# Patient Record
Sex: MALE | Race: OTHER | HISPANIC OR LATINO
[De-identification: names, ages, dates, MRNs, and addresses within clinical notes are randomized per-mention and may not be internally consistent; named-entity substitution may affect disease eponyms.]

---

## 2017-07-31 ENCOUNTER — APPOINTMENT (OUTPATIENT)
Dept: OPHTHALMOLOGY | Facility: CLINIC | Age: 62
End: 2017-07-31

## 2017-09-25 ENCOUNTER — APPOINTMENT (OUTPATIENT)
Dept: INTERNAL MEDICINE | Facility: CLINIC | Age: 62
End: 2017-09-25
Payer: COMMERCIAL

## 2017-09-25 PROCEDURE — 99214 OFFICE O/P EST MOD 30 MIN: CPT

## 2017-10-09 ENCOUNTER — APPOINTMENT (OUTPATIENT)
Dept: PULMONOLOGY | Facility: CLINIC | Age: 62
End: 2017-10-09
Payer: COMMERCIAL

## 2017-10-09 VITALS
RESPIRATION RATE: 16 BRPM | DIASTOLIC BLOOD PRESSURE: 92 MMHG | BODY MASS INDEX: 36.58 KG/M2 | SYSTOLIC BLOOD PRESSURE: 150 MMHG | WEIGHT: 247 LBS | OXYGEN SATURATION: 98 % | HEART RATE: 84 BPM | HEIGHT: 69 IN

## 2017-10-09 PROCEDURE — 99214 OFFICE O/P EST MOD 30 MIN: CPT

## 2017-11-08 ENCOUNTER — APPOINTMENT (OUTPATIENT)
Dept: INTERNAL MEDICINE | Facility: CLINIC | Age: 62
End: 2017-11-08
Payer: COMMERCIAL

## 2017-11-08 PROCEDURE — 99214 OFFICE O/P EST MOD 30 MIN: CPT

## 2018-04-02 ENCOUNTER — APPOINTMENT (OUTPATIENT)
Dept: PULMONOLOGY | Facility: CLINIC | Age: 63
End: 2018-04-02
Payer: COMMERCIAL

## 2018-04-02 VITALS
SYSTOLIC BLOOD PRESSURE: 148 MMHG | HEART RATE: 73 BPM | RESPIRATION RATE: 16 BRPM | DIASTOLIC BLOOD PRESSURE: 90 MMHG | OXYGEN SATURATION: 97 % | HEIGHT: 69 IN | WEIGHT: 245 LBS | BODY MASS INDEX: 36.29 KG/M2

## 2018-04-02 PROCEDURE — 99214 OFFICE O/P EST MOD 30 MIN: CPT

## 2018-04-11 RX ORDER — LISINOPRIL 10 MG/1
10 TABLET ORAL DAILY
Qty: 90 | Refills: 1 | Status: DISCONTINUED | COMMUNITY
Start: 2017-11-08 | End: 2018-04-11

## 2018-05-08 ENCOUNTER — RX RENEWAL (OUTPATIENT)
Age: 63
End: 2018-05-08

## 2018-08-09 RX ORDER — VALSARTAN 80 MG/1
80 TABLET, COATED ORAL
Qty: 30 | Refills: 3 | Status: DISCONTINUED | COMMUNITY
Start: 2018-04-11 | End: 2018-08-09

## 2018-10-03 ENCOUNTER — APPOINTMENT (OUTPATIENT)
Dept: PULMONOLOGY | Facility: CLINIC | Age: 63
End: 2018-10-03
Payer: COMMERCIAL

## 2018-10-03 VITALS
HEIGHT: 69 IN | WEIGHT: 245 LBS | HEART RATE: 72 BPM | OXYGEN SATURATION: 95 % | DIASTOLIC BLOOD PRESSURE: 81 MMHG | BODY MASS INDEX: 36.29 KG/M2 | SYSTOLIC BLOOD PRESSURE: 125 MMHG

## 2018-10-03 DIAGNOSIS — J06.9 ACUTE UPPER RESPIRATORY INFECTION, UNSPECIFIED: ICD-10-CM

## 2018-10-03 PROCEDURE — 99214 OFFICE O/P EST MOD 30 MIN: CPT

## 2018-10-08 ENCOUNTER — RX RENEWAL (OUTPATIENT)
Age: 63
End: 2018-10-08

## 2018-10-11 ENCOUNTER — RX RENEWAL (OUTPATIENT)
Age: 63
End: 2018-10-11

## 2018-10-12 ENCOUNTER — APPOINTMENT (OUTPATIENT)
Dept: INTERNAL MEDICINE | Facility: CLINIC | Age: 63
End: 2018-10-12
Payer: COMMERCIAL

## 2018-10-12 VITALS
BODY MASS INDEX: 36.88 KG/M2 | SYSTOLIC BLOOD PRESSURE: 137 MMHG | RESPIRATION RATE: 14 BRPM | HEART RATE: 71 BPM | DIASTOLIC BLOOD PRESSURE: 89 MMHG | WEIGHT: 249 LBS | TEMPERATURE: 99.1 F | OXYGEN SATURATION: 96 % | HEIGHT: 69 IN

## 2018-10-12 DIAGNOSIS — Z91.89 OTHER SPECIFIED PERSONAL RISK FACTORS, NOT ELSEWHERE CLASSIFIED: ICD-10-CM

## 2018-10-12 PROCEDURE — 99214 OFFICE O/P EST MOD 30 MIN: CPT

## 2018-10-12 RX ORDER — AZITHROMYCIN 250 MG/1
250 TABLET, FILM COATED ORAL
Qty: 1 | Refills: 0 | Status: DISCONTINUED | COMMUNITY
Start: 2018-10-03 | End: 2018-10-12

## 2018-10-12 RX ORDER — GUAIFENESIN 600 MG/1
600 TABLET, EXTENDED RELEASE ORAL TWICE DAILY
Qty: 60 | Refills: 0 | Status: DISCONTINUED | COMMUNITY
Start: 2017-11-08 | End: 2018-10-12

## 2018-10-12 NOTE — PHYSICAL EXAM

## 2018-10-12 NOTE — HISTORY OF PRESENT ILLNESS
[de-identified] : PT COMES FOR F/U HTN,DYSLIPIDEMIA,OBESITY,KALLIE AND DM\par WAS RECENTLY SEEN BY PULMONARY,ADMITS POOR COMPLIANCE WITH CPAP\par WAS SEEN BY ENT FOR NASAL POLYS AND MAY UNDERGO SURGERY IN UP COMING MONTHS.\par HAD LABS LAST WEEK AND WILL SEE ENDO NEXT WEEK

## 2018-10-12 NOTE — ASSESSMENT
[FreeTextEntry1] : F/U VISIT OF 63 Y OLD MALE WITH PMX OF PROSTATE CA S/P RT,R KNEE OA S/P SURGERY.HTN,DYSLIPIDEMIA,DM ,OBESITY AND KALLIE= F/U ENDO,PULMONARY AND RX FOR HZ VACCINE PROVIDED\par RTO 3 M OR PRN \par MEDS RX SENT\par COMPLIANCE WITH CPAP RECOMM\par F/U ENT FOR NASAL POLYPS

## 2019-04-03 ENCOUNTER — APPOINTMENT (OUTPATIENT)
Dept: PULMONOLOGY | Facility: CLINIC | Age: 64
End: 2019-04-03
Payer: COMMERCIAL

## 2019-04-03 VITALS
DIASTOLIC BLOOD PRESSURE: 88 MMHG | OXYGEN SATURATION: 96 % | WEIGHT: 236 LBS | HEIGHT: 69 IN | SYSTOLIC BLOOD PRESSURE: 133 MMHG | HEART RATE: 82 BPM | BODY MASS INDEX: 34.96 KG/M2

## 2019-04-03 PROCEDURE — 99214 OFFICE O/P EST MOD 30 MIN: CPT

## 2019-04-03 NOTE — HISTORY OF PRESENT ILLNESS
[FreeTextEntry1] : 64 yo male with hx of KALLIE and OAD presents for follow up. The patient is compliant with CPAP use. He denies sleep related complaints. He did not receive his mew CPAP mask and supplies as ordered after last visit.\par The patient uses singulair daily with PRN productive cough. He complains of increase in cough and sputum over the last week without fever, chest pain or hemoptysis.

## 2019-04-03 NOTE — PHYSICAL EXAM
[General Appearance - Well Developed] : well developed [Normal Appearance] : normal appearance [Well Groomed] : well groomed [General Appearance - Well Nourished] : well nourished [No Deformities] : no deformities [General Appearance - In No Acute Distress] : no acute distress [Normal Conjunctiva] : the conjunctiva exhibited no abnormalities [Eyelids - No Xanthelasma] : the eyelids demonstrated no xanthelasmas [Normal Oropharynx] : normal oropharynx [Neck Appearance] : the appearance of the neck was normal [Neck Cervical Mass (___cm)] : no neck mass was observed [Jugular Venous Distention Increased] : there was no jugular-venous distention [Thyroid Diffuse Enlargement] : the thyroid was not enlarged [Thyroid Nodule] : there were no palpable thyroid nodules [Heart Rate And Rhythm] : heart rate and rhythm were normal [Heart Sounds] : normal S1 and S2 [Murmurs] : no murmurs present [Respiration, Rhythm And Depth] : normal respiratory rhythm and effort [Exaggerated Use Of Accessory Muscles For Inspiration] : no accessory muscle use [Auscultation Breath Sounds / Voice Sounds] : lungs were clear to auscultation bilaterally [Abdomen Soft] : soft [Abdomen Tenderness] : non-tender [Abdomen Mass (___ Cm)] : no abdominal mass palpated [Abnormal Walk] : normal gait [Gait - Sufficient For Exercise Testing] : the gait was sufficient for exercise testing [Nail Clubbing] : no clubbing of the fingernails [Cyanosis, Localized] : no localized cyanosis [Petechial Hemorrhages (___cm)] : no petechial hemorrhages [Skin Color & Pigmentation] : normal skin color and pigmentation [] : no rash [No Venous Stasis] : no venous stasis [Skin Lesions] : no skin lesions [No Skin Ulcers] : no skin ulcer [No Xanthoma] : no  xanthoma was observed [No Focal Deficits] : no focal deficits [Oriented To Time, Place, And Person] : oriented to person, place, and time [Impaired Insight] : insight and judgment were intact [Affect] : the affect was normal

## 2019-04-03 NOTE — DISCUSSION/SUMMARY
[FreeTextEntry1] : 62 yo male with stable pulmonary exam. Given the recent increase in cough however, he was given a sample of ICS to use (arnuity 100). He is to continue use of montelukast daily with PRN albuterol MDI. Treatment adjustment will depend on symptomatic needs. Continue compliance with CPAP use was stressed. His DME company will be contacted again. He is to follow up with his PMD as before.

## 2019-04-03 NOTE — REVIEW OF SYSTEMS
[Fever] : no fever [Chills] : no chills [Fatigue] : no fatigue [Nasal Congestion] : no nasal congestion [As Noted in HPI] : as noted in HPI [Cough] : cough [Sputum] : sputum  [Hemoptysis] : no hemoptysis [Dyspnea] : no dyspnea [Hypertension] : ~T hypertension [Hypotension] : no hypotension [Snoring] : snoring [Witnessed Apneas] : demonstrated ~M apnea [Hypersomnolence] : sleeping much more than usual [Negative] : Pulmonary Hypertension

## 2019-04-10 ENCOUNTER — LABORATORY RESULT (OUTPATIENT)
Age: 64
End: 2019-04-10

## 2019-04-10 ENCOUNTER — APPOINTMENT (OUTPATIENT)
Dept: INTERNAL MEDICINE | Facility: CLINIC | Age: 64
End: 2019-04-10
Payer: COMMERCIAL

## 2019-04-10 ENCOUNTER — NON-APPOINTMENT (OUTPATIENT)
Age: 64
End: 2019-04-10

## 2019-04-10 VITALS
DIASTOLIC BLOOD PRESSURE: 85 MMHG | OXYGEN SATURATION: 95 % | SYSTOLIC BLOOD PRESSURE: 130 MMHG | BODY MASS INDEX: 35.84 KG/M2 | TEMPERATURE: 98 F | HEIGHT: 69 IN | WEIGHT: 242 LBS | HEART RATE: 86 BPM | RESPIRATION RATE: 12 BRPM

## 2019-04-10 PROCEDURE — 99215 OFFICE O/P EST HI 40 MIN: CPT

## 2019-04-10 PROCEDURE — 93000 ELECTROCARDIOGRAM COMPLETE: CPT

## 2019-04-10 RX ORDER — ZOSTER VACCINE RECOMBINANT, ADJUVANTED 50 MCG/0.5
50 KIT INTRAMUSCULAR
Qty: 1 | Refills: 1 | Status: DISCONTINUED | COMMUNITY
Start: 2018-10-12 | End: 2019-04-10

## 2019-04-10 NOTE — HISTORY OF PRESENT ILLNESS
[No Pertinent Cardiac History] : no history of aortic stenosis, atrial fibrillation, coronary artery disease, recent myocardial infarction, or implantable device/pacemaker [COPD] : COPD [Sleep Apnea] : sleep apnea [Chronic Anticoagulation] : no chronic anticoagulation [No Adverse Anesthesia Reaction] : no adverse anesthesia reaction in self or family member [Chronic Kidney Disease] : no chronic kidney disease [Diabetes] : diabetes [FreeTextEntry2] : 4/25/19 [FreeTextEntry1] : SEPTOPLASTY-TURBINATES ABLATION

## 2019-04-10 NOTE — PHYSICAL EXAM
[No Acute Distress] : no acute distress [Well Nourished] : well nourished [Well Developed] : well developed [Well-Appearing] : well-appearing [Normal Sclera/Conjunctiva] : normal sclera/conjunctiva [PERRL] : pupils equal round and reactive to light [EOMI] : extraocular movements intact [Normal Outer Ear/Nose] : the outer ears and nose were normal in appearance [Normal Oropharynx] : the oropharynx was normal [No JVD] : no jugular venous distention [Supple] : supple [No Lymphadenopathy] : no lymphadenopathy [Thyroid Normal, No Nodules] : the thyroid was normal and there were no nodules present [No Respiratory Distress] : no respiratory distress  [Clear to Auscultation] : lungs were clear to auscultation bilaterally [No Accessory Muscle Use] : no accessory muscle use [Normal Rate] : normal rate  [Regular Rhythm] : with a regular rhythm [Normal S1, S2] : normal S1 and S2 [No Murmur] : no murmur heard [No Carotid Bruits] : no carotid bruits [No Abdominal Bruit] : a ~M bruit was not heard ~T in the abdomen [No Varicosities] : no varicosities [Pedal Pulses Present] : the pedal pulses are present [No Edema] : there was no peripheral edema [No Extremity Clubbing/Cyanosis] : no extremity clubbing/cyanosis [Soft] : abdomen soft [No Palpable Aorta] : no palpable aorta [Non Tender] : non-tender [Non-distended] : non-distended [No Masses] : no abdominal mass palpated [No HSM] : no HSM [Normal Bowel Sounds] : normal bowel sounds [Normal Posterior Cervical Nodes] : no posterior cervical lymphadenopathy [Normal Anterior Cervical Nodes] : no anterior cervical lymphadenopathy [No Spinal Tenderness] : no spinal tenderness [No CVA Tenderness] : no CVA  tenderness [No Joint Swelling] : no joint swelling [Grossly Normal Strength/Tone] : grossly normal strength/tone [No Rash] : no rash [Normal Gait] : normal gait [Coordination Grossly Intact] : coordination grossly intact [No Focal Deficits] : no focal deficits [Deep Tendon Reflexes (DTR)] : deep tendon reflexes were 2+ and symmetric [Normal Affect] : the affect was normal [Normal Insight/Judgement] : insight and judgment were intact [de-identified] : ANNMARIE NASAL MUCOSAE EDEMA AND NARROW AIR FLOW PASSAGE  [de-identified] : OBESE

## 2019-04-11 LAB
25(OH)D3 SERPL-MCNC: 24.3 NG/ML
ALBUMIN SERPL ELPH-MCNC: 4.6 G/DL
ALP BLD-CCNC: 75 U/L
ALT SERPL-CCNC: 39 U/L
ANION GAP SERPL CALC-SCNC: 16 MMOL/L
APPEARANCE: CLEAR
AST SERPL-CCNC: 29 U/L
BASOPHILS # BLD AUTO: 0.03 K/UL
BASOPHILS NFR BLD AUTO: 0.4 %
BILIRUB SERPL-MCNC: 0.2 MG/DL
BILIRUBIN URINE: NEGATIVE
BLOOD URINE: NEGATIVE
BUN SERPL-MCNC: 15 MG/DL
CALCIUM SERPL-MCNC: 9.8 MG/DL
CHLORIDE SERPL-SCNC: 102 MMOL/L
CHOLEST SERPL-MCNC: 177 MG/DL
CHOLEST/HDLC SERPL: 3.9 RATIO
CO2 SERPL-SCNC: 21 MMOL/L
COLOR: YELLOW
CREAT SERPL-MCNC: 0.85 MG/DL
EOSINOPHIL # BLD AUTO: 0.2 K/UL
EOSINOPHIL NFR BLD AUTO: 3 %
ESTIMATED AVERAGE GLUCOSE: 151 MG/DL
GLUCOSE QUALITATIVE U: NEGATIVE
GLUCOSE SERPL-MCNC: 133 MG/DL
HBA1C MFR BLD HPLC: 6.9 %
HCT VFR BLD CALC: 49.2 %
HDLC SERPL-MCNC: 46 MG/DL
HGB BLD-MCNC: 15.8 G/DL
IMM GRANULOCYTES NFR BLD AUTO: 0.4 %
KETONES URINE: NEGATIVE
LDLC SERPL CALC-MCNC: 70 MG/DL
LEUKOCYTE ESTERASE URINE: NEGATIVE
LYMPHOCYTES # BLD AUTO: 1.87 K/UL
LYMPHOCYTES NFR BLD AUTO: 28 %
MAN DIFF?: NORMAL
MCHC RBC-ENTMCNC: 30.8 PG
MCHC RBC-ENTMCNC: 32.1 GM/DL
MCV RBC AUTO: 95.9 FL
MONOCYTES # BLD AUTO: 0.35 K/UL
MONOCYTES NFR BLD AUTO: 5.2 %
NEUTROPHILS # BLD AUTO: 4.19 K/UL
NEUTROPHILS NFR BLD AUTO: 63 %
NITRITE URINE: NEGATIVE
PH URINE: 5.5
PLATELET # BLD AUTO: 278 K/UL
POTASSIUM SERPL-SCNC: 4.6 MMOL/L
PROT SERPL-MCNC: 7.3 G/DL
PROTEIN URINE: ABNORMAL
RBC # BLD: 5.13 M/UL
RBC # FLD: 13.7 %
SODIUM SERPL-SCNC: 139 MMOL/L
SPECIFIC GRAVITY URINE: 1.02
TRIGL SERPL-MCNC: 303 MG/DL
TSH SERPL-ACNC: 1.79 UIU/ML
URATE SERPL-MCNC: 4.4 MG/DL
UROBILINOGEN URINE: NORMAL
VIT B12 SERPL-MCNC: 411 PG/ML
WBC # FLD AUTO: 6.67 K/UL

## 2019-09-23 ENCOUNTER — RX RENEWAL (OUTPATIENT)
Age: 64
End: 2019-09-23

## 2019-10-09 ENCOUNTER — APPOINTMENT (OUTPATIENT)
Dept: PULMONOLOGY | Facility: CLINIC | Age: 64
End: 2019-10-09

## 2019-10-14 ENCOUNTER — RX RENEWAL (OUTPATIENT)
Age: 64
End: 2019-10-14

## 2019-10-23 ENCOUNTER — LABORATORY RESULT (OUTPATIENT)
Age: 64
End: 2019-10-23

## 2019-10-23 ENCOUNTER — APPOINTMENT (OUTPATIENT)
Dept: INTERNAL MEDICINE | Facility: CLINIC | Age: 64
End: 2019-10-23
Payer: COMMERCIAL

## 2019-10-23 VITALS
DIASTOLIC BLOOD PRESSURE: 80 MMHG | TEMPERATURE: 98.6 F | WEIGHT: 238 LBS | RESPIRATION RATE: 14 BRPM | SYSTOLIC BLOOD PRESSURE: 129 MMHG | HEART RATE: 73 BPM | BODY MASS INDEX: 35.25 KG/M2 | HEIGHT: 69 IN | OXYGEN SATURATION: 94 %

## 2019-10-23 LAB
PSA FREE FLD-MCNC: 14 %
PSA FREE SERPL-MCNC: 0.05 NG/ML
PSA SERPL-MCNC: 0.35 NG/ML

## 2019-10-23 PROCEDURE — 99396 PREV VISIT EST AGE 40-64: CPT

## 2019-10-23 NOTE — PHYSICAL EXAM
[No Acute Distress] : no acute distress [Well Nourished] : well nourished [Well Developed] : well developed [Normal Sclera/Conjunctiva] : normal sclera/conjunctiva [Well-Appearing] : well-appearing [PERRL] : pupils equal round and reactive to light [Normal Outer Ear/Nose] : the outer ears and nose were normal in appearance [EOMI] : extraocular movements intact [No JVD] : no jugular venous distention [Normal Oropharynx] : the oropharynx was normal [Supple] : supple [No Lymphadenopathy] : no lymphadenopathy [Thyroid Normal, No Nodules] : the thyroid was normal and there were no nodules present [No Accessory Muscle Use] : no accessory muscle use [No Respiratory Distress] : no respiratory distress  [Clear to Auscultation] : lungs were clear to auscultation bilaterally [Normal Rate] : normal rate  [Normal S1, S2] : normal S1 and S2 [Regular Rhythm] : with a regular rhythm [No Murmur] : no murmur heard [No Carotid Bruits] : no carotid bruits [No Abdominal Bruit] : a ~M bruit was not heard ~T in the abdomen [No Varicosities] : no varicosities [Pedal Pulses Present] : the pedal pulses are present [No Edema] : there was no peripheral edema [No Palpable Aorta] : no palpable aorta [No Extremity Clubbing/Cyanosis] : no extremity clubbing/cyanosis [Soft] : abdomen soft [Non Tender] : non-tender [Non-distended] : non-distended [No Masses] : no abdominal mass palpated [No HSM] : no HSM [Normal Bowel Sounds] : normal bowel sounds [Normal Posterior Cervical Nodes] : no posterior cervical lymphadenopathy [Normal Anterior Cervical Nodes] : no anterior cervical lymphadenopathy [No CVA Tenderness] : no CVA  tenderness [No Joint Swelling] : no joint swelling [No Spinal Tenderness] : no spinal tenderness [Grossly Normal Strength/Tone] : grossly normal strength/tone [No Rash] : no rash [Coordination Grossly Intact] : coordination grossly intact [Normal Gait] : normal gait [No Focal Deficits] : no focal deficits [Deep Tendon Reflexes (DTR)] : deep tendon reflexes were 2+ and symmetric [Normal Affect] : the affect was normal [Normal Insight/Judgement] : insight and judgment were intact [de-identified] : OBESE [de-identified] : PAPULAR RASH ON MID UPPER BACK AND LEFT ARM ;SUTURE ON R WRIST BIOPSY BY DERM 1 WEEK AGO

## 2019-10-23 NOTE — HISTORY OF PRESENT ILLNESS
[de-identified] : PT COMES FOR CPE\par SEEN BY ENDO 1 WEEK AGO AND ALSO BY DERM DUE TO PRURITIC RASH THAT DEVELOPED 2 WEEKS AGO SOON AFTER RETURNING FROM  [place] WHERE HE STATED  [FreeTextEntry1] : PRURITIC RASH

## 2019-10-23 NOTE — ASSESSMENT
[FreeTextEntry1] : CPE OF 64 Y OLD MALE WITH PMX OF DM AND DYSLIPIDEMIA= LABS BY ENDO LAST WEEK\par HTN = STABLE \par OBESITY= DISCUSSED\par PRURITIC RASH SEEN BY DERM WITH BIOPSY AND CREAM RX\par LABS ORDERED

## 2019-10-27 LAB
BARLEY IGE QN: 0.17 KUA/L
CHERRY IGE QN: 0.26 KUA/L
COW MILK IGE QN: <0.1 KUA/L
CRAB IGE QN: <0.1 KUA/L
DEPRECATED BARLEY IGE RAST QL: NORMAL
DEPRECATED CHERRY IGE RAST QL: NORMAL
DEPRECATED COW MILK IGE RAST QL: 0
DEPRECATED CRAB IGE RAST QL: 0
DEPRECATED EGG WHITE IGE RAST QL: 0
DEPRECATED OAT IGE RAST QL: NORMAL
DEPRECATED PEANUT IGE RAST QL: NORMAL
DEPRECATED RYE IGE RAST QL: NORMAL
DEPRECATED SOYBEAN IGE RAST QL: NORMAL
DEPRECATED WHEAT IGE RAST QL: NORMAL
EGG WHITE IGE QN: <0.1 KUA/L
OAT IGE QN: 0.19 KUA/L
PEANUT IGE QN: 0.15 KUA/L
RYE IGE QN: 0.17 KUA/L
SOYBEAN IGE QN: 0.11 KUA/L
TOTAL IGE SMQN RAST: 596 KU/L
WHEAT IGE QN: 0.19 KUA/L

## 2019-10-29 LAB
25(OH)D3 SERPL-MCNC: 23.6 NG/ML
A ALTERNATA IGE QN: <0.1 KUA/L
A FUMIGATUS IGE QN: <0.1 KUA/L
BERMUDA GRASS IGE QN: 0.78 KUA/L
BOXELDER IGE QN: <0.1 KUA/L
C HERBARUM IGE QN: <0.1 KUA/L
CALIF WALNUT IGE QN: 0.12 KUA/L
CAT DANDER IGE QN: 0.12 KUA/L
CMN PIGWEED IGE QN: 0.12 KUA/L
COMMON RAGWEED IGE QN: 0.18 KUA/L
COTTONWOOD IGE QN: 0.14 KUA/L
D FARINAE IGE QN: <0.1 KUA/L
D PTERONYSS IGE QN: <0.1 KUA/L
DEPRECATED A ALTERNATA IGE RAST QL: 0
DEPRECATED A FUMIGATUS IGE RAST QL: 0
DEPRECATED BERMUDA GRASS IGE RAST QL: 2
DEPRECATED BOXELDER IGE RAST QL: 0
DEPRECATED C HERBARUM IGE RAST QL: 0
DEPRECATED CAT DANDER IGE RAST QL: NORMAL
DEPRECATED COMMON PIGWEED IGE RAST QL: NORMAL
DEPRECATED COMMON RAGWEED IGE RAST QL: NORMAL
DEPRECATED COTTONWOOD IGE RAST QL: NORMAL
DEPRECATED D FARINAE IGE RAST QL: 0
DEPRECATED D PTERONYSS IGE RAST QL: 0
DEPRECATED DOG DANDER IGE RAST QL: NORMAL
DEPRECATED GOOSEFOOT IGE RAST QL: NORMAL
DEPRECATED LONDON PLANE IGE RAST QL: 1
DEPRECATED MUGWORT IGE RAST QL: 1
DEPRECATED P NOTATUM IGE RAST QL: 0
DEPRECATED RED CEDAR IGE RAST QL: NORMAL
DEPRECATED ROACH IGE RAST QL: NORMAL
DEPRECATED SHEEP SORREL IGE RAST QL: NORMAL
DEPRECATED SILVER BIRCH IGE RAST QL: 2
DEPRECATED TIMOTHY IGE RAST QL: 2
DEPRECATED WHITE ASH IGE RAST QL: 2
DEPRECATED WHITE OAK IGE RAST QL: 2
DOG DANDER IGE QN: 0.1 KUA/L
GOOSEFOOT IGE QN: 0.22 KUA/L
LONDON PLANE IGE QN: 0.62 KUA/L
MUGWORT IGE QN: 0.46 KUA/L
MULBERRY (T70) CLASS: NORMAL
MULBERRY (T70) CONC: 0.13 KUA/L
P NOTATUM IGE QN: <0.1 KUA/L
RED CEDAR IGE QN: 0.14 KUA/L
ROACH IGE QN: 0.13 KUA/L
SHEEP SORREL IGE QN: 0.1 KUA/L
SILVER BIRCH IGE QN: 1.06 KUA/L
TIMOTHY IGE QN: 2.43 KUA/L
TREE ALLERG MIX1 IGE QL: NORMAL
URATE SERPL-MCNC: 4.1 MG/DL
WHITE ASH IGE QN: 1.29 KUA/L
WHITE ELM IGE QN: 0.14 KUA/L
WHITE ELM IGE QN: NORMAL
WHITE OAK IGE QN: 1.02 KUA/L

## 2019-11-13 ENCOUNTER — APPOINTMENT (OUTPATIENT)
Dept: INTERNAL MEDICINE | Facility: CLINIC | Age: 64
End: 2019-11-13
Payer: COMMERCIAL

## 2019-11-13 ENCOUNTER — NON-APPOINTMENT (OUTPATIENT)
Age: 64
End: 2019-11-13

## 2019-11-13 VITALS
TEMPERATURE: 97.5 F | SYSTOLIC BLOOD PRESSURE: 138 MMHG | HEART RATE: 82 BPM | HEIGHT: 69 IN | WEIGHT: 235 LBS | OXYGEN SATURATION: 96 % | BODY MASS INDEX: 34.8 KG/M2 | RESPIRATION RATE: 14 BRPM | DIASTOLIC BLOOD PRESSURE: 75 MMHG

## 2019-11-13 LAB
BASOPHILS # BLD AUTO: 0.03 K/UL
BASOPHILS NFR BLD AUTO: 0.4 %
EOSINOPHIL # BLD AUTO: 0.17 K/UL
EOSINOPHIL NFR BLD AUTO: 2.5 %
HCT VFR BLD CALC: 48.4 %
HGB BLD-MCNC: 16 G/DL
IMM GRANULOCYTES NFR BLD AUTO: 0.3 %
LYMPHOCYTES # BLD AUTO: 2.02 K/UL
LYMPHOCYTES NFR BLD AUTO: 29.7 %
MAN DIFF?: NORMAL
MCHC RBC-ENTMCNC: 31.1 PG
MCHC RBC-ENTMCNC: 33.1 GM/DL
MCV RBC AUTO: 94 FL
MONOCYTES # BLD AUTO: 0.55 K/UL
MONOCYTES NFR BLD AUTO: 8.1 %
NEUTROPHILS # BLD AUTO: 4.01 K/UL
NEUTROPHILS NFR BLD AUTO: 59 %
PLATELET # BLD AUTO: 224 K/UL
RBC # BLD: 5.15 M/UL
RBC # FLD: 13.6 %
WBC # FLD AUTO: 6.8 K/UL

## 2019-11-13 PROCEDURE — 99215 OFFICE O/P EST HI 40 MIN: CPT

## 2019-11-13 PROCEDURE — 93000 ELECTROCARDIOGRAM COMPLETE: CPT

## 2019-11-13 NOTE — PHYSICAL EXAM
[No Acute Distress] : no acute distress [Well Nourished] : well nourished [Well-Appearing] : well-appearing [Well Developed] : well developed [Normal Sclera/Conjunctiva] : normal sclera/conjunctiva [EOMI] : extraocular movements intact [PERRL] : pupils equal round and reactive to light [Normal Outer Ear/Nose] : the outer ears and nose were normal in appearance [No Lymphadenopathy] : no lymphadenopathy [No JVD] : no jugular venous distention [Normal Oropharynx] : the oropharynx was normal [Supple] : supple [Thyroid Normal, No Nodules] : the thyroid was normal and there were no nodules present [No Accessory Muscle Use] : no accessory muscle use [No Respiratory Distress] : no respiratory distress  [Clear to Auscultation] : lungs were clear to auscultation bilaterally [Normal Rate] : normal rate  [Regular Rhythm] : with a regular rhythm [Normal S1, S2] : normal S1 and S2 [No Murmur] : no murmur heard [No Varicosities] : no varicosities [No Carotid Bruits] : no carotid bruits [No Abdominal Bruit] : a ~M bruit was not heard ~T in the abdomen [Pedal Pulses Present] : the pedal pulses are present [No Palpable Aorta] : no palpable aorta [No Edema] : there was no peripheral edema [No Extremity Clubbing/Cyanosis] : no extremity clubbing/cyanosis [Soft] : abdomen soft [Non-distended] : non-distended [Non Tender] : non-tender [No Masses] : no abdominal mass palpated [No HSM] : no HSM [Normal Posterior Cervical Nodes] : no posterior cervical lymphadenopathy [Normal Bowel Sounds] : normal bowel sounds [No Spinal Tenderness] : no spinal tenderness [No CVA Tenderness] : no CVA  tenderness [Normal Anterior Cervical Nodes] : no anterior cervical lymphadenopathy [Grossly Normal Strength/Tone] : grossly normal strength/tone [No Joint Swelling] : no joint swelling [No Rash] : no rash [No Focal Deficits] : no focal deficits [Coordination Grossly Intact] : coordination grossly intact [Normal Affect] : the affect was normal [Deep Tendon Reflexes (DTR)] : deep tendon reflexes were 2+ and symmetric [Normal Gait] : normal gait [Normal Insight/Judgement] : insight and judgment were intact [de-identified] : OBESE [de-identified] : ERYTHEMATOUS RASH ON UPPER BACK AND RUE

## 2019-11-13 NOTE — ASSESSMENT
[Patient Optimized for Surgery] : Patient optimized for surgery [As per surgery] : as per surgery [Continue medications as is] : Continue current medications [No Further Testing Recommended] : no further testing recommended [FreeTextEntry4] : 64 Y OLD MALE WITH PMX OF HTN ,DM ,DYSLIPIDEMIA  AND OBESITY AT ACCEPTABLE RISK FOR LEFT HAND SURGERY

## 2019-11-13 NOTE — HISTORY OF PRESENT ILLNESS
[No Pertinent Cardiac History] : no history of aortic stenosis, atrial fibrillation, coronary artery disease, recent myocardial infarction, or implantable device/pacemaker [No Pertinent Pulmonary History] : no history of asthma, COPD, sleep apnea, or smoking [Self] : no previous adverse anesthesia reaction [Diabetes] : diabetes [FreeTextEntry3] : DR CALDWELL [FreeTextEntry2] : 11/21/19 [FreeTextEntry1] : FOREIGN BODY LEFT HAND EXCISION

## 2019-11-14 LAB
ANION GAP SERPL CALC-SCNC: 15 MMOL/L
APPEARANCE: CLEAR
APTT BLD: 34.6 SEC
BILIRUBIN URINE: NEGATIVE
BLOOD URINE: NEGATIVE
BUN SERPL-MCNC: 20 MG/DL
CALCIUM SERPL-MCNC: 10.5 MG/DL
CHLORIDE SERPL-SCNC: 102 MMOL/L
CO2 SERPL-SCNC: 23 MMOL/L
COLOR: NORMAL
CREAT SERPL-MCNC: 0.89 MG/DL
GLUCOSE QUALITATIVE U: ABNORMAL
GLUCOSE SERPL-MCNC: 128 MG/DL
INR PPP: 0.97 RATIO
KETONES URINE: NEGATIVE
LEUKOCYTE ESTERASE URINE: NEGATIVE
NITRITE URINE: NEGATIVE
PH URINE: 5
POTASSIUM SERPL-SCNC: 4.3 MMOL/L
PROTEIN URINE: NEGATIVE
PT BLD: 11 SEC
SODIUM SERPL-SCNC: 140 MMOL/L
SPECIFIC GRAVITY URINE: 1.03
UROBILINOGEN URINE: NORMAL

## 2020-04-15 ENCOUNTER — RX RENEWAL (OUTPATIENT)
Age: 65
End: 2020-04-15

## 2020-12-16 PROBLEM — J06.9 URI, ACUTE: Status: RESOLVED | Noted: 2018-10-03 | Resolved: 2020-12-16

## 2021-03-03 ENCOUNTER — APPOINTMENT (OUTPATIENT)
Dept: INTERNAL MEDICINE | Facility: CLINIC | Age: 66
End: 2021-03-03
Payer: COMMERCIAL

## 2021-03-03 VITALS
TEMPERATURE: 98.9 F | WEIGHT: 225 LBS | SYSTOLIC BLOOD PRESSURE: 154 MMHG | DIASTOLIC BLOOD PRESSURE: 90 MMHG | HEIGHT: 69 IN | BODY MASS INDEX: 33.33 KG/M2 | RESPIRATION RATE: 14 BRPM | HEART RATE: 69 BPM | OXYGEN SATURATION: 97 %

## 2021-03-03 PROCEDURE — 99072 ADDL SUPL MATRL&STAF TM PHE: CPT

## 2021-03-03 PROCEDURE — 99396 PREV VISIT EST AGE 40-64: CPT

## 2021-03-03 PROCEDURE — 93000 ELECTROCARDIOGRAM COMPLETE: CPT

## 2021-03-03 RX ORDER — UBIDECARENONE/VIT E ACET 100MG-5
50 MCG CAPSULE ORAL
Qty: 90 | Refills: 2 | Status: DISCONTINUED | COMMUNITY
Start: 2018-04-11 | End: 2021-03-03

## 2021-03-03 NOTE — HISTORY OF PRESENT ILLNESS
[de-identified] : PT COMES FOR CPE \par WAS DX WITH COVID-19 PNEUMONIA 12/8/20 IN [place],WAS TREATED AT HOME FOR 6 WEEKS ;[place] 10 [place] \par STILL FEELS WEAK AND DEVELOPS BARAJAS\par SEEN BY ENDO 2 DAYS AGO WITH LABS\par IN [place] TOLD HIS HEART WAS ENLARGED?\par ONLY TAKING METFORMIN 1000 BID\par OFF HTN AND DYSLIPIDEMIA MEDS

## 2021-03-03 NOTE — PHYSICAL EXAM

## 2021-03-03 NOTE — REVIEW OF SYSTEMS
[Fatigue] : fatigue [Recent Change In Weight] : ~T recent weight change [Dyspnea on Exertion] : dyspnea on exertion [Negative] : Heme/Lymph

## 2021-03-03 NOTE — ASSESSMENT
[FreeTextEntry1] : CPE OF 65 Y OLD MALE WITH PMX OF DM ,OBESITY ,KALLIE AND S/P PROLONGED COVID-19 INF SX= RELEASE RECORDS FROM ENDO\par CARDIOLOGY AND PULM EVAL RTO IN 1 M

## 2021-03-04 ENCOUNTER — APPOINTMENT (OUTPATIENT)
Dept: PULMONOLOGY | Facility: CLINIC | Age: 66
End: 2021-03-04
Payer: COMMERCIAL

## 2021-03-04 VITALS
TEMPERATURE: 98 F | OXYGEN SATURATION: 97 % | HEART RATE: 74 BPM | BODY MASS INDEX: 33.33 KG/M2 | DIASTOLIC BLOOD PRESSURE: 77 MMHG | RESPIRATION RATE: 14 BRPM | HEIGHT: 69 IN | SYSTOLIC BLOOD PRESSURE: 138 MMHG | WEIGHT: 225 LBS

## 2021-03-04 PROCEDURE — 94729 DIFFUSING CAPACITY: CPT

## 2021-03-04 PROCEDURE — 99072 ADDL SUPL MATRL&STAF TM PHE: CPT

## 2021-03-04 PROCEDURE — 94726 PLETHYSMOGRAPHY LUNG VOLUMES: CPT

## 2021-03-04 PROCEDURE — 99214 OFFICE O/P EST MOD 30 MIN: CPT | Mod: 25

## 2021-03-04 PROCEDURE — 95012 NITRIC OXIDE EXP GAS DETER: CPT

## 2021-03-04 PROCEDURE — 94060 EVALUATION OF WHEEZING: CPT

## 2021-03-11 ENCOUNTER — APPOINTMENT (OUTPATIENT)
Dept: HEART AND VASCULAR | Facility: CLINIC | Age: 66
End: 2021-03-11
Payer: COMMERCIAL

## 2021-03-11 VITALS
SYSTOLIC BLOOD PRESSURE: 127 MMHG | DIASTOLIC BLOOD PRESSURE: 84 MMHG | HEART RATE: 80 BPM | RESPIRATION RATE: 15 BRPM | BODY MASS INDEX: 33.47 KG/M2 | HEIGHT: 69 IN | TEMPERATURE: 97.8 F | OXYGEN SATURATION: 97 % | WEIGHT: 226 LBS

## 2021-03-11 DIAGNOSIS — R07.89 OTHER CHEST PAIN: ICD-10-CM

## 2021-03-11 PROCEDURE — 99072 ADDL SUPL MATRL&STAF TM PHE: CPT

## 2021-03-11 PROCEDURE — 99214 OFFICE O/P EST MOD 30 MIN: CPT

## 2021-03-11 PROCEDURE — 93306 TTE W/DOPPLER COMPLETE: CPT

## 2021-03-11 NOTE — PHYSICAL EXAM
[General Appearance - Well Developed] : well developed [Normal Appearance] : normal appearance [Well Groomed] : well groomed [General Appearance - Well Nourished] : well nourished [No Deformities] : no deformities [General Appearance - In No Acute Distress] : no acute distress [Normal Oral Mucosa] : normal oral mucosa [No Oral Pallor] : no oral pallor [No Oral Cyanosis] : no oral cyanosis [Normal Jugular Venous A Waves Present] : normal jugular venous A waves present [Normal Jugular Venous V Waves Present] : normal jugular venous V waves present [No Jugular Venous Carnes A Waves] : no jugular venous carnes A waves [Heart Rate And Rhythm] : heart rate and rhythm were normal [Heart Sounds] : normal S1 and S2 [Murmurs] : no murmurs present [Respiration, Rhythm And Depth] : normal respiratory rhythm and effort [Exaggerated Use Of Accessory Muscles For Inspiration] : no accessory muscle use [Auscultation Breath Sounds / Voice Sounds] : lungs were clear to auscultation bilaterally [Abdomen Soft] : soft [Abdomen Tenderness] : non-tender [] : no hepato-splenomegaly [Abdomen Mass (___ Cm)] : no abdominal mass palpated

## 2021-03-11 NOTE — ASSESSMENT
[FreeTextEntry1] : 64 yo M with PMH obesity, HTN, HLD, DM here for first evaluation \par \par CHEST PAIN\par -in the setting of risk factors, typical symptoms and abnormal EKG recommend to define coronary anatomy and risk stratify for CAD with cardiac catheterization\par -the patient was explained at length the risk and benefits of the procedure and would like to think about it and discuss with his wife\par -recc to start metoprolol succinate 25 mg daily for antianginal \par -cont with asprinin and statin\par -echo today \par \par HTN \par well controlled \par -cont with current meds\par \par f/u in 1 week

## 2021-03-11 NOTE — HISTORY OF PRESENT ILLNESS
[FreeTextEntry1] : 64 yo M with PMH obesity, HTN, HLD, DM here for first evaluation \par reports he had covid -19 in December and recently started experiencing exertional chest pressure and shortnes of breath on exertion\par Reports that prior to covid-19 he was experiencing exertional chest pressure (was working as a  ) which resolved with rest\par He is able to walk for few blocks\par Denies  palpitations, syncope, presyncope, LE edema, orthopnea. \par \par PMH\par HTN\par DM\par HLD\par prostate cancer s/p brachy tp in 2014\par gout\par \par PSH\par cholecystectomy \par hernia repair x2\par knee surgery\par \par ALL\par NKDA\par \par MEDS\par metfromin\par losartan 50 \par lipitor 10 \par \par SH\par no smoke, no etoh, no drugs\par \par EKG: SR, RBBB\par \par LABS\par crea 0.9\par \par

## 2021-03-12 DIAGNOSIS — Z01.812 ENCOUNTER FOR PREPROCEDURAL LABORATORY EXAMINATION: ICD-10-CM

## 2021-03-17 ENCOUNTER — APPOINTMENT (OUTPATIENT)
Dept: HEART AND VASCULAR | Facility: CLINIC | Age: 66
End: 2021-03-17
Payer: COMMERCIAL

## 2021-03-17 PROCEDURE — ZZZZZ: CPT

## 2021-03-18 ENCOUNTER — APPOINTMENT (OUTPATIENT)
Dept: HEART AND VASCULAR | Facility: CLINIC | Age: 66
End: 2021-03-18

## 2021-03-18 VITALS
HEIGHT: 66 IN | DIASTOLIC BLOOD PRESSURE: 74 MMHG | RESPIRATION RATE: 16 BRPM | HEART RATE: 61 BPM | OXYGEN SATURATION: 96 % | TEMPERATURE: 98 F | WEIGHT: 225.09 LBS | SYSTOLIC BLOOD PRESSURE: 131 MMHG

## 2021-03-18 LAB — SARS-COV-2 N GENE NPH QL NAA+PROBE: NOT DETECTED

## 2021-03-18 RX ORDER — CHLORHEXIDINE GLUCONATE 213 G/1000ML
1 SOLUTION TOPICAL ONCE
Refills: 0 | Status: DISCONTINUED | OUTPATIENT
Start: 2021-03-19 | End: 2021-04-02

## 2021-03-18 NOTE — H&P ADULT - NSHPLABSRESULTS_GEN_ALL_CORE
ECG:       Labs: pending ECG: sinus rhonda @ 57, QTc 383      Labs:                        15.2   6.18  )-----------( 208      ( 19 Mar 2021 08:52 )             45.9       Auto Neutrophil %: 58.5 % (03-19-21 @ 08:52)  Auto Immature Granulocyte %: 0.3 % (03-19-21 @ 08:52)      Coags:     11.9   ----< 0.99    ( 19 Mar 2021 08:52 )     38.7

## 2021-03-18 NOTE — H&P ADULT - NSHPSOCIALHISTORY_GEN_ALL_CORE
Former smoker, quit 30 years ago. Smoked 1 pack per week. Light EtOH use on holidays. Denies recreational drug use.

## 2021-03-18 NOTE — H&P ADULT - ASSESSMENT
66 yo obese M, former smoker with PMH of HTN, HLD, DM, KALLIE (not on CPAP), prostate CA s/p brachytherapy, and COVID-19 (12/2020) presented to his cardiologist Dr. Valladares for evaluation of exertional CP and BARAJAS, Echo (3/11/2021) revealing EF 50-55%, mild LVH, abnormal septal motion, PASP 23 mmHg.  Pt now presents for cardiac cath with possible intervention for suspected CAD.			  ASA _II____	Mallampati class: ___II______	                  Sedation Plan: Moderate      Patient Is Suitable Candidate For Sedation: Yes       Risks & benefits of procedure and sedation and risks and benefits for the alternative therapy have been explained to the patient in layman’s terms including but not limited to: allergic reaction, bleeding, infection, arrhythmia, respiratory compromise, renal and vascular compromise, limb damage, MI, CVA, emergent CABG/Vascular Surgery and death. Informed consent obtained and in chart.    Plan:    -consent signed and in chart  -IVF: NS @ 75/hr  -ASA/Plavix load

## 2021-03-18 NOTE — H&P ADULT - NSICDXPASTMEDICALHX_GEN_ALL_CORE_FT
PAST MEDICAL HISTORY:  DM (diabetes mellitus)     Gout     HLD (hyperlipidemia)     HTN (hypertension)     Prostate cancer s/p brachytherapy in 2014

## 2021-03-18 NOTE — H&P ADULT - HISTORY OF PRESENT ILLNESS
**SKELETON    COVID: Negative 3/17/21 WM  Cardiologist: Dr. Valladares  Pharmacy:   Escort:    64 YO obese, former smoker M with PMH of HTN, HLD, DM, prostate CA s/p brachytherapy, and COVID-19 (12/2020) presented to his cardiologist, Dr. Valladares, for evaluation of exertional CP and BARAJAS x ____ months. Pt experiences this CP while working as a , but its resolves with rest. He is able to walk  ____ blocks. Of note, these symptoms began prior to his COVID diagnosis. Denies palpitations, syncope, LE edema, orthopnea. EKG (3/11/21): NSR, RBBB.    In light of patient’s risk factors, CCS anginal class ____ , and abnormal EKG, patient will undergo cardiac catheterization with possible intervention if indicated.              **SKELETON    COVID: Negative 3/17/21 WM  Cardiologist: Dr. Valladares  Pharmacy:   Escort:    64 YO obese, former smoker M with PMH of HTN, HLD, DM, prostate CA s/p brachytherapy, and COVID-19 (12/2020) presented to his cardiologist, Dr. Valladares, for evaluation of exertional CP and BARAJAS x ____ months. Pt experiences this CP while working as a , but it resolves with rest. He is able to walk a few blocks before experiencing SOB. Of note, these symptoms began prior to his COVID diagnosis. Denies palpitations, syncope, LE edema, orthopnea. EKG (3/11/21): NSR, RBBB.    In light of patient’s risk factors, CCS anginal class ____ , and abnormal EKG, patient will undergo cardiac catheterization with possible intervention if indicated.            COVID: Negative 3/17/21 WM  Cardiologist: Dr. Valladares  Pharmacy: [facility]  Escort: yes     Confirm [facility] location and meds      64 YO obese, former smoker M with PMH of HTN, HLD, DM, KALLEI (no CPAP), prostate CA s/p brachytherapy, and COVID-19 (12/2020) presented to his cardiologist, Dr. Valladares, for evaluation of exertional CP and BARAJAS that started after diagnosis of COVID-19. Pt experiences this CP "under the left side of ribs." These symptoms occur while working as a  or after walking a mile to the park, but resolve with rest. Denies palpitations, N/V, syncope, LE edema, orthopnea. Pt reports Dr. Alvarado ordered an echo, which came back as abnormal and is the reason he scheduled a cardiac cath. No report in his chart.      In light of patient’s risk factors and CCS anginal class 2, patient will undergo cardiac catheterization with possible intervention if indicated.            COVID: Negative 3/17/21 WM  Cardiologist: Dr. Valladares  Pharmacy: [facility]  Escort: yes     Confirm [facility] location and meds      64 YO obese, former smoker M with PMH of HTN, HLD, DM, KALLIE (no CPAP), prostate CA s/p brachytherapy, and COVID-19 (12/2020) presented to his cardiologist, Dr. Valladares, for evaluation of exertional CP and BARAJAS that started after diagnosis of COVID-19. Pt experiences this CP "under the left side of ribs." These symptoms occur while working as a  or after walking a mile to the park, but resolve with rest. Denies palpitations, N/V, syncope, LE edema, orthopnea. Echo (3/11/2021) showed mild LV hypertrophy, abnormal septal motion ,PA systolic pressure of 23 mmHg.     In light of patient’s risk factors and CCS anginal class 2, patient will undergo cardiac catheterization with possible intervention if indicated.            COVID: Negative 3/17/21 WM  Cardiologist: Dr. Valladares  Pharmacy: [facility]  Escort: yes     Confirm [facility] location and meds      64 YO obese, former smoker M with PMH of HTN, HLD, DM, KALLIE (no CPAP), prostate CA s/p brachytherapy, and COVID-19 (12/2020) presented to his cardiologist, Dr. Valladares, for evaluation of exertional CP and BARAJAS that started after diagnosis of COVID-19. Pt experiences this CP "under the left side of his ribs." These symptoms occur while working as a  or after walking a mile to the park, but resolve with rest. Denies palpitations, N/V, syncope, LE edema, orthopnea. Echo (3/11/2021) showed mild LV hypertrophy, abnormal septal motion ,PA systolic pressure of 23 mmHg.     In light of patient’s risk factors and CCS anginal class 2, patient will undergo cardiac catheterization with possible intervention if indicated.            COVID: Negative 3/17/21 WM  Cardiologist: Dr. Valladares  Pharmacy: [facility]  Escort: yes     Confirm [facility] location and meds      66 YO obese, former smoker M with PMH of HTN, HLD, DM, KALLIE (no CPAP), prostate CA s/p brachytherapy, and COVID-19 (12/2020) presented to his cardiologist, Dr. Valladares, for evaluation of exertional CP and BARAJAS that started after diagnosis of COVID-19. Pt experiences this CP "under the left side of his ribs." These symptoms occur while he works as a  or after walking a mile to the park, but resolve with rest. Denies palpitations, N/V, syncope, LE edema, orthopnea. Echo (3/11/2021) showed mild LV hypertrophy, abnormal septal motion, PA systolic pressure of 23 mmHg.     In light of patient’s risk factors and CCS anginal class 2, patient will undergo cardiac catheterization with possible intervention if indicated.            COVID: Negative 3/17/21 WM  Cardiologist: Dr. Valladares  Pharmacy: [facility]  Escort: yes     Confirm [facility] location and meds      66 yo obese M, former smoker with PMH of HTN, HLD, DM, KALLIE (not on CPAP), prostate CA s/p brachytherapy, and COVID-19 (12/2020) presented to his cardiologist Dr. Valladares for evaluation of exertional CP and BARAJAS that started after diagnosis of COVID-19. Pt experiences this CP "under the left side of his ribs." These symptoms occur while he works as a  or after walking a mile to the park, but resolve with rest. Denies palpitations, N/V, syncope, LE edema, orthopnea. Echo (3/11/2021) revealing EF 50-55%, mild LVH, abnormal septal motion, PASP 23 mmHg. In light of patient’s risk factors and CCS anginal class II symptoms pt is referred to [facility] for cardiac catheterization with possible intervention if indicated.  COVID: Negative 3/17/21 WM  Cardiologist: Dr. Valladares  Pharmacy: [facility]  Escort: yes     66 yo obese M, former smoker with PMH of HTN, HLD, DM, KALLIE (not on CPAP), prostate CA s/p brachytherapy, and COVID-19 (12/2020) presented to his cardiologist Dr. Valladares for evaluation of exertional CP and BARAJAS that started after diagnosis of COVID-19. Pt experiences this CP "under the left side of his ribs." These symptoms occur while he works as a  or after walking a mile to the park, but resolve with rest. Denies palpitations, N/V, syncope, LE edema, orthopnea. Echo (3/11/2021) revealing EF 50-55%, mild LVH, abnormal septal motion, PASP 23 mmHg.     In light of patient’s risk factors and CCS anginal class II symptoms pt is referred to [facility] for cardiac catheterization with possible intervention if indicated.

## 2021-03-19 ENCOUNTER — OUTPATIENT (OUTPATIENT)
Dept: OUTPATIENT SERVICES | Facility: HOSPITAL | Age: 66
LOS: 1 days | Discharge: HOME | End: 2021-03-19
Payer: COMMERCIAL

## 2021-03-19 DIAGNOSIS — Z98.890 OTHER SPECIFIED POSTPROCEDURAL STATES: Chronic | ICD-10-CM

## 2021-03-19 DIAGNOSIS — Z90.49 ACQUIRED ABSENCE OF OTHER SPECIFIED PARTS OF DIGESTIVE TRACT: Chronic | ICD-10-CM

## 2021-03-19 PROBLEM — C61 MALIGNANT NEOPLASM OF PROSTATE: Chronic | Status: ACTIVE | Noted: 2021-03-18

## 2021-03-19 PROBLEM — E78.5 HYPERLIPIDEMIA, UNSPECIFIED: Chronic | Status: ACTIVE | Noted: 2021-03-18

## 2021-03-19 PROBLEM — M10.9 GOUT, UNSPECIFIED: Chronic | Status: ACTIVE | Noted: 2021-03-18

## 2021-03-19 PROBLEM — I10 ESSENTIAL (PRIMARY) HYPERTENSION: Chronic | Status: ACTIVE | Noted: 2021-03-18

## 2021-03-19 PROBLEM — E11.9 TYPE 2 DIABETES MELLITUS WITHOUT COMPLICATIONS: Chronic | Status: ACTIVE | Noted: 2021-03-18

## 2021-03-19 LAB
A1C WITH ESTIMATED AVERAGE GLUCOSE RESULT: 7.1 % — HIGH (ref 4–5.6)
ALBUMIN SERPL ELPH-MCNC: 4.4 G/DL — SIGNIFICANT CHANGE UP (ref 3.3–5)
ALP SERPL-CCNC: 68 U/L — SIGNIFICANT CHANGE UP (ref 40–120)
ALT FLD-CCNC: 26 U/L — SIGNIFICANT CHANGE UP (ref 10–45)
ANION GAP SERPL CALC-SCNC: 10 MMOL/L — SIGNIFICANT CHANGE UP (ref 5–17)
APTT BLD: 38.7 SEC — HIGH (ref 27.5–35.5)
AST SERPL-CCNC: 18 U/L — SIGNIFICANT CHANGE UP (ref 10–40)
BASOPHILS # BLD AUTO: 0.04 K/UL — SIGNIFICANT CHANGE UP (ref 0–0.2)
BASOPHILS NFR BLD AUTO: 0.6 % — SIGNIFICANT CHANGE UP (ref 0–2)
BILIRUB DIRECT SERPL-MCNC: <0.2 MG/DL — SIGNIFICANT CHANGE UP (ref 0–0.2)
BILIRUB INDIRECT FLD-MCNC: SIGNIFICANT CHANGE UP MG/DL (ref 0.2–1)
BILIRUB SERPL-MCNC: 0.3 MG/DL — SIGNIFICANT CHANGE UP (ref 0.2–1.2)
BUN SERPL-MCNC: 19 MG/DL — SIGNIFICANT CHANGE UP (ref 7–23)
CALCIUM SERPL-MCNC: 9.6 MG/DL — SIGNIFICANT CHANGE UP (ref 8.4–10.5)
CHLORIDE SERPL-SCNC: 104 MMOL/L — SIGNIFICANT CHANGE UP (ref 96–108)
CK MB CFR SERPL CALC: 1.3 NG/ML — SIGNIFICANT CHANGE UP (ref 0–6.7)
CK SERPL-CCNC: 53 U/L — SIGNIFICANT CHANGE UP (ref 30–200)
CO2 SERPL-SCNC: 26 MMOL/L — SIGNIFICANT CHANGE UP (ref 22–31)
CREAT SERPL-MCNC: 0.8 MG/DL — SIGNIFICANT CHANGE UP (ref 0.5–1.3)
EOSINOPHIL # BLD AUTO: 0.18 K/UL — SIGNIFICANT CHANGE UP (ref 0–0.5)
EOSINOPHIL NFR BLD AUTO: 2.9 % — SIGNIFICANT CHANGE UP (ref 0–6)
ESTIMATED AVERAGE GLUCOSE: 157 MG/DL — HIGH (ref 68–114)
GLUCOSE BLDC GLUCOMTR-MCNC: 110 MG/DL — HIGH (ref 70–99)
GLUCOSE SERPL-MCNC: 135 MG/DL — HIGH (ref 70–99)
HCT VFR BLD CALC: 45.9 % — SIGNIFICANT CHANGE UP (ref 39–50)
HGB BLD-MCNC: 15.2 G/DL — SIGNIFICANT CHANGE UP (ref 13–17)
IMM GRANULOCYTES NFR BLD AUTO: 0.3 % — SIGNIFICANT CHANGE UP (ref 0–1.5)
INR BLD: 0.99 — SIGNIFICANT CHANGE UP (ref 0.88–1.16)
LYMPHOCYTES # BLD AUTO: 1.89 K/UL — SIGNIFICANT CHANGE UP (ref 1–3.3)
LYMPHOCYTES # BLD AUTO: 30.6 % — SIGNIFICANT CHANGE UP (ref 13–44)
MCHC RBC-ENTMCNC: 31 PG — SIGNIFICANT CHANGE UP (ref 27–34)
MCHC RBC-ENTMCNC: 33.1 GM/DL — SIGNIFICANT CHANGE UP (ref 32–36)
MCV RBC AUTO: 93.7 FL — SIGNIFICANT CHANGE UP (ref 80–100)
MONOCYTES # BLD AUTO: 0.44 K/UL — SIGNIFICANT CHANGE UP (ref 0–0.9)
MONOCYTES NFR BLD AUTO: 7.1 % — SIGNIFICANT CHANGE UP (ref 2–14)
NEUTROPHILS # BLD AUTO: 3.61 K/UL — SIGNIFICANT CHANGE UP (ref 1.8–7.4)
NEUTROPHILS NFR BLD AUTO: 58.5 % — SIGNIFICANT CHANGE UP (ref 43–77)
NRBC # BLD: 0 /100 WBCS — SIGNIFICANT CHANGE UP (ref 0–0)
PLATELET # BLD AUTO: 208 K/UL — SIGNIFICANT CHANGE UP (ref 150–400)
POTASSIUM SERPL-MCNC: 4.3 MMOL/L — SIGNIFICANT CHANGE UP (ref 3.5–5.3)
POTASSIUM SERPL-SCNC: 4.3 MMOL/L — SIGNIFICANT CHANGE UP (ref 3.5–5.3)
PROT SERPL-MCNC: 7 G/DL — SIGNIFICANT CHANGE UP (ref 6–8.3)
PROTHROM AB SERPL-ACNC: 11.9 SEC — SIGNIFICANT CHANGE UP (ref 10.6–13.6)
RBC # BLD: 4.9 M/UL — SIGNIFICANT CHANGE UP (ref 4.2–5.8)
RBC # FLD: 13 % — SIGNIFICANT CHANGE UP (ref 10.3–14.5)
SODIUM SERPL-SCNC: 140 MMOL/L — SIGNIFICANT CHANGE UP (ref 135–145)
WBC # BLD: 6.18 K/UL — SIGNIFICANT CHANGE UP (ref 3.8–10.5)
WBC # FLD AUTO: 6.18 K/UL — SIGNIFICANT CHANGE UP (ref 3.8–10.5)

## 2021-03-19 PROCEDURE — 93458 L HRT ARTERY/VENTRICLE ANGIO: CPT

## 2021-03-19 PROCEDURE — 93458 L HRT ARTERY/VENTRICLE ANGIO: CPT | Mod: 26

## 2021-03-19 PROCEDURE — 82550 ASSAY OF CK (CPK): CPT

## 2021-03-19 PROCEDURE — 93005 ELECTROCARDIOGRAM TRACING: CPT

## 2021-03-19 PROCEDURE — 93010 ELECTROCARDIOGRAM REPORT: CPT

## 2021-03-19 PROCEDURE — 83036 HEMOGLOBIN GLYCOSYLATED A1C: CPT

## 2021-03-19 PROCEDURE — C1769: CPT

## 2021-03-19 PROCEDURE — C1887: CPT

## 2021-03-19 PROCEDURE — 99152 MOD SED SAME PHYS/QHP 5/>YRS: CPT

## 2021-03-19 PROCEDURE — C1894: CPT

## 2021-03-19 PROCEDURE — 82553 CREATINE MB FRACTION: CPT

## 2021-03-19 PROCEDURE — 82248 BILIRUBIN DIRECT: CPT

## 2021-03-19 PROCEDURE — 82962 GLUCOSE BLOOD TEST: CPT

## 2021-03-19 PROCEDURE — 85730 THROMBOPLASTIN TIME PARTIAL: CPT

## 2021-03-19 PROCEDURE — 80053 COMPREHEN METABOLIC PANEL: CPT

## 2021-03-19 PROCEDURE — 85610 PROTHROMBIN TIME: CPT

## 2021-03-19 PROCEDURE — 85025 COMPLETE CBC W/AUTO DIFF WBC: CPT

## 2021-03-19 RX ORDER — METOPROLOL TARTRATE 50 MG
0.5 TABLET ORAL
Qty: 15 | Refills: 1
Start: 2021-03-19 | End: 2021-05-17

## 2021-03-19 RX ORDER — CLOPIDOGREL BISULFATE 75 MG/1
600 TABLET, FILM COATED ORAL ONCE
Refills: 0 | Status: COMPLETED | OUTPATIENT
Start: 2021-03-19 | End: 2021-03-19

## 2021-03-19 RX ORDER — MIRABEGRON 50 MG/1
1 TABLET, EXTENDED RELEASE ORAL
Qty: 0 | Refills: 0 | DISCHARGE

## 2021-03-19 RX ORDER — METFORMIN HYDROCHLORIDE 850 MG/1
1 TABLET ORAL
Qty: 0 | Refills: 0 | DISCHARGE

## 2021-03-19 RX ORDER — LOSARTAN POTASSIUM 100 MG/1
1 TABLET, FILM COATED ORAL
Qty: 0 | Refills: 0 | DISCHARGE

## 2021-03-19 RX ORDER — SODIUM CHLORIDE 9 MG/ML
1000 INJECTION INTRAMUSCULAR; INTRAVENOUS; SUBCUTANEOUS
Refills: 0 | Status: DISCONTINUED | OUTPATIENT
Start: 2021-03-19 | End: 2021-03-19

## 2021-03-19 RX ORDER — ATORVASTATIN CALCIUM 80 MG/1
1 TABLET, FILM COATED ORAL
Qty: 0 | Refills: 0 | DISCHARGE

## 2021-03-19 RX ORDER — TAMSULOSIN HYDROCHLORIDE 0.4 MG/1
1 CAPSULE ORAL
Qty: 0 | Refills: 0 | DISCHARGE

## 2021-03-19 RX ORDER — ASPIRIN/CALCIUM CARB/MAGNESIUM 324 MG
325 TABLET ORAL ONCE
Refills: 0 | Status: COMPLETED | OUTPATIENT
Start: 2021-03-19 | End: 2021-03-19

## 2021-03-19 RX ORDER — CANAGLIFLOZIN 100 MG/1
1 TABLET, FILM COATED ORAL
Qty: 0 | Refills: 0 | DISCHARGE

## 2021-03-19 RX ORDER — SODIUM CHLORIDE 9 MG/ML
500 INJECTION INTRAMUSCULAR; INTRAVENOUS; SUBCUTANEOUS
Refills: 0 | Status: DISCONTINUED | OUTPATIENT
Start: 2021-03-19 | End: 2021-04-02

## 2021-03-19 RX ADMIN — CLOPIDOGREL BISULFATE 600 MILLIGRAM(S): 75 TABLET, FILM COATED ORAL at 09:33

## 2021-03-19 RX ADMIN — Medication 325 MILLIGRAM(S): at 09:33

## 2021-03-19 RX ADMIN — SODIUM CHLORIDE 75 MILLILITER(S): 9 INJECTION INTRAMUSCULAR; INTRAVENOUS; SUBCUTANEOUS at 09:08

## 2021-03-19 NOTE — PROGRESS NOTE ADULT - SUBJECTIVE AND OBJECTIVE BOX
Interventional Cardiology PA SDA Discharge Note    Patient without complaints.     Afebrile, VSS    Ext:    		  		        Right    Radial : no   hematoma,  no   bleeding, dressing; C/D/I      Pulses:    intact RAD to baseline     A/P:        64 yo obese M, former smoker with PMH of HTN, HLD, DM, KALLIE (not on CPAP), prostate CA s/p brachytherapy, and COVID-19 (12/2020) presented to his cardiologist Dr. Valladares for evaluation of exertional CP and BARAJAS that started after diagnosis of COVID-19. Pt experiences this CP "under the left side of his ribs." These symptoms occur while he works as a  or after walking a mile to the park, but resolve with rest. Denies palpitations, N/V, syncope, LE edema, orthopnea. Echo (3/11/2021) revealing EF 50-55%, mild LVH, abnormal septal motion, PASP 23 mmHg.  In light of patient’s risk factors and CCS anginal class II symptoms pt is referred to [facility] for cardiac catheterization with possible intervention if indicated.  Pt. s/p cardiac cath 3/19/21:           1.	Stable for discharge as per attending  ___Jacquelyn______ after bed rest, pt voids, wrist stable and 30 minutes of ambulation.  2.	Follow-up with PMD/Cardiologist __Dr. Valladares_________ in 1-2 weeks  3.	Discharged forms signed and copies in chart      Interventional Cardiology PA SDA Discharge Note    Patient without complaints.     Afebrile, VSS    Ext:    		  		        Right    Radial : no   hematoma,  no   bleeding, dressing; C/D/I      Pulses:    intact RAD to baseline     A/P:        66 yo obese M, former smoker with PMH of HTN, HLD, DM, KALLIE (not on CPAP), prostate CA s/p brachytherapy, and COVID-19 (12/2020) presented to his cardiologist Dr. Valladares for evaluation of exertional CP and BARAJAS that started after diagnosis of COVID-19. Pt experiences this CP "under the left side of his ribs." These symptoms occur while he works as a  or after walking a mile to the park, but resolve with rest. Denies palpitations, N/V, syncope, LE edema, orthopnea. Echo (3/11/2021) revealing EF 50-55%, mild LVH, abnormal septal motion, PASP 23 mmHg.  In light of patient’s risk factors and CCS anginal class II symptoms pt is referred to [facility] for cardiac catheterization with possible intervention if indicated.  Pt. s/p cardiac cath 3/19/21:  LMCA normal, luminal mid bridge LAD/LCx. ef 55 %, edp 13          1.	Stable for discharge as per attending  ___Jacquelyn______ after bed rest, pt voids, wrist stable and 30 minutes of ambulation.  2.	Follow-up with PMD/Cardiologist __Dr. Valladares_________ in 1-2 weeks  3.	Discharged forms signed and copies in chart      Interventional Cardiology PA SDA Discharge Note    Patient without complaints.     Afebrile, VSS    Ext:    		  		        Right    Radial : no   hematoma,  no   bleeding, dressing; C/D/I      Pulses:    intact RAD to baseline     A/P:        66 yo obese M, former smoker with PMH of HTN, HLD, DM, KALLIE (not on CPAP), prostate CA s/p brachytherapy, and COVID-19 (12/2020) presented to his cardiologist Dr. Valladares for evaluation of exertional CP and BARAJAS that started after diagnosis of COVID-19. Pt experiences this CP "under the left side of his ribs." These symptoms occur while he works as a  or after walking a mile to the park, but resolve with rest. Denies palpitations, N/V, syncope, LE edema, orthopnea. Echo (3/11/2021) revealing EF 50-55%, mild LVH, abnormal septal motion, PASP 23 mmHg.  In light of patient’s risk factors and CCS anginal class II symptoms pt is referred to [facility] for cardiac catheterization with possible intervention if indicated.  Pt. s/p cardiac cath 3/19/21:  LMCA normal, luminal mid bridge LAD/LCx. ef 55 %, edp 13  Pt also prescribed Toprol 12.5 mf daily as per d/w Dr. Valladares. he will titrate it up as tolerated as o/p.           1.	Stable for discharge as per attending  ___Jacquelyn______ after bed rest, pt voids, wrist stable and 30 minutes of ambulation.  2.	Follow-up with PMD/Cardiologist __Dr. Valladares_________ in 1-2 weeks  3.	Discharged forms signed and copies in chart

## 2021-03-19 NOTE — PROGRESS NOTE ADULT - SUBJECTIVE AND OBJECTIVE BOX
PROCEDURE: CORONARY ANGIOGRAM, LHC, LVGRAM  INDICATION: UNSTABLE ANGINA   ATTENDING: DR. JOSÉ MANUEL VALLADARES MD   ACCESS: RRA    FINDINGS   LM= normal   LAD= luminal, short portion of intramyocardial bridging  LCx= luminal irregularities  Ramus= large vessel, luminal   RCA= luminal, dominant     LVEF:55 %  LVEDP: 19 mmHg  No AS/MR    A/P  -please schedule outpatient follow up with Dr. Valladares in a week in [facility] ( tel: [phone])

## 2021-03-25 ENCOUNTER — APPOINTMENT (OUTPATIENT)
Dept: HEART AND VASCULAR | Facility: CLINIC | Age: 66
End: 2021-03-25
Payer: COMMERCIAL

## 2021-03-25 VITALS
BODY MASS INDEX: 34.07 KG/M2 | TEMPERATURE: 98 F | HEIGHT: 69 IN | RESPIRATION RATE: 15 BRPM | DIASTOLIC BLOOD PRESSURE: 78 MMHG | OXYGEN SATURATION: 96 % | HEART RATE: 71 BPM | SYSTOLIC BLOOD PRESSURE: 127 MMHG | WEIGHT: 230 LBS

## 2021-03-25 PROCEDURE — 99214 OFFICE O/P EST MOD 30 MIN: CPT

## 2021-03-25 PROCEDURE — 99072 ADDL SUPL MATRL&STAF TM PHE: CPT

## 2021-03-25 NOTE — ASSESSMENT
[FreeTextEntry1] : 66 yo M with PMH obesity, HTN, HLD, DM here for follow up \par \par CHEST PAIN\par -resolved \par -normal coronaries except for small segment of intramyocardial bridging on cath 3/2021\par -recc cont  metoprolol succinate 25 mg daily for antianginal \par -cont with aspirin and statin\par \par HTN \par well controlled \par -cont with losartan 50 mg daily \par -cont with metoprolol 25 mg daily \par \par HLD \par -cont with atorvastatin 10 mg daily \par -cont wiith healthy diet and exercise \par \par f/u in 3 months or sooner if any symptoms\par cont with f/u with PMD

## 2021-03-25 NOTE — HISTORY OF PRESENT ILLNESS
[FreeTextEntry1] : 64 yo M with PMH obesity, HTN, HLD, DM here for follow up evaluation after recent cardiac cath which showed normal coronaries and a small segment of intramyocardial bridging. \par Started on low dose beta blocker reports feeling fine \par He is able to walk for multiple blocks without any symptoms \par Denies  palpitations, syncope, presyncope, LE edema, orthopnea. \par \par PMH\par HTN\par DM\par HLD\par prostate cancer s/p brachy tp in 2014\par gout\par \par PSH\par cholecystectomy \par hernia repair x2\par knee surgery\par \par ALL\par NKDA\par \par MEDS\par metfromin\par losartan 50 \par lipitor 10 \par \par SH\par no smoke, no etoh, no drugs\par \par EKG: SR, RBBB\par \par LABS 3/1/2021\par crea 0.9\par ldl 88\par \par CARDIAC CATH 3/19/2021\par LM= normal \par LAD= luminal, short portion of intramyocardial bridging\par LCx= luminal irregularities\par Ramus= large vessel, luminal \par RCA= luminal, dominant \par \par LVEF:55 %\par LVEDP: 19 mmHg\par No AS/MR\par \par

## 2021-03-25 NOTE — HISTORY OF PRESENT ILLNESS
[FreeTextEntry1] : 66 yo M with PMH obesity, HTN, HLD, DM here for follow up evaluation after recent cardiac cath which showed normal coronaries and a small segment of intramyocardial bridging. \par Started on low dose beta blocker reports feeling fine \par He is able to walk for multiple blocks without any symptoms \par Denies  palpitations, syncope, presyncope, LE edema, orthopnea. \par \par PMH\par HTN\par DM\par HLD\par prostate cancer s/p brachy tp in 2014\par gout\par \par PSH\par cholecystectomy \par hernia repair x2\par knee surgery\par \par ALL\par NKDA\par \par MEDS\par metfromin\par losartan 50 \par lipitor 10 \par \par SH\par no smoke, no etoh, no drugs\par \par EKG: SR, RBBB\par \par LABS 3/1/2021\par crea 0.9\par ldl 88\par \par CARDIAC CATH 3/19/2021\par LM= normal \par LAD= luminal, short portion of intramyocardial bridging\par LCx= luminal irregularities\par Ramus= large vessel, luminal \par RCA= luminal, dominant \par \par LVEF:55 %\par LVEDP: 19 mmHg\par No AS/MR\par \par

## 2021-03-26 DIAGNOSIS — E78.5 HYPERLIPIDEMIA, UNSPECIFIED: ICD-10-CM

## 2021-03-26 DIAGNOSIS — R07.9 CHEST PAIN, UNSPECIFIED: ICD-10-CM

## 2021-03-26 DIAGNOSIS — Z72.0 TOBACCO USE: ICD-10-CM

## 2021-03-26 DIAGNOSIS — R93.1 ABNORMAL FINDINGS ON DIAGNOSTIC IMAGING OF HEART AND CORONARY CIRCULATION: ICD-10-CM

## 2021-03-26 DIAGNOSIS — I10 ESSENTIAL (PRIMARY) HYPERTENSION: ICD-10-CM

## 2021-03-26 DIAGNOSIS — I25.110 ATHEROSCLEROTIC HEART DISEASE OF NATIVE CORONARY ARTERY WITH UNSTABLE ANGINA PECTORIS: ICD-10-CM

## 2021-03-30 ENCOUNTER — APPOINTMENT (OUTPATIENT)
Dept: PULMONOLOGY | Facility: CLINIC | Age: 66
End: 2021-03-30
Payer: COMMERCIAL

## 2021-03-30 VITALS
HEIGHT: 69 IN | OXYGEN SATURATION: 95 % | HEART RATE: 70 BPM | SYSTOLIC BLOOD PRESSURE: 135 MMHG | DIASTOLIC BLOOD PRESSURE: 86 MMHG | WEIGHT: 230 LBS | RESPIRATION RATE: 14 BRPM | BODY MASS INDEX: 34.07 KG/M2 | TEMPERATURE: 98.6 F

## 2021-03-30 DIAGNOSIS — U07.1 COVID-19: ICD-10-CM

## 2021-03-30 DIAGNOSIS — J12.82 COVID-19: ICD-10-CM

## 2021-03-30 PROCEDURE — 99214 OFFICE O/P EST MOD 30 MIN: CPT

## 2021-03-30 PROCEDURE — 99072 ADDL SUPL MATRL&STAF TM PHE: CPT

## 2021-03-30 NOTE — HISTORY OF PRESENT ILLNESS
[Never] : never [TextBox_4] : Patient is a 65-year-old obese male with past medical history significant for hypertension, high cholesterol, diabetes, history of obstructive sleep apnea in the past who recently was diagnosed with COVID-19 pneumonia and [place] and who presents today for a pulmonary consult.  The patient complains of cough shortness of breath and dyspnea on exertion.  He also complains of nonrestorative sleep and morning headaches.  He complains of intermittent myalgias.  He denies any fevers chills weight loss or hemoptysis

## 2021-03-30 NOTE — REVIEW OF SYSTEMS
[Fatigue] : fatigue [Poor Appetite] : no poor appetite [Cough] : cough [Hemoptysis] : no hemoptysis [Chest Tightness] : chest tightness [Frequent URIs] : no frequent URIs [Sputum] : no sputum [Dyspnea] : dyspnea [Pleuritic Pain] : pleuritic pain [Wheezing] : no wheezing [A.M. Dry Mouth] : a.m. dry mouth [SOB on Exertion] : sob on exertion [Negative] : Endocrine

## 2021-03-30 NOTE — REASON FOR VISIT
[Consultation] : a consultation [Cough] : cough [Shortness of Breath] : shortness of breath [Wheezing] : wheezing [TextBox_44] : COVID-19 pneumonia

## 2021-03-30 NOTE — PHYSICAL EXAM
[No Acute Distress] : no acute distress [Normal Oropharynx] : normal oropharynx [III] : Mallampati Class: III [Normal Appearance] : normal appearance [No Neck Mass] : no neck mass [Normal Rate/Rhythm] : normal rate/rhythm [Normal S1, S2] : normal s1, s2 [No Murmurs] : no murmurs [No Resp Distress] : no resp distress [Clear to Auscultation Bilaterally] : clear to auscultation bilaterally [No Abnormalities] : no abnormalities [Benign] : benign [Normal Gait] : normal gait [No Clubbing] : no clubbing [No Cyanosis] : no cyanosis [No Edema] : no edema [FROM] : FROM [Normal Color/ Pigmentation] : normal color/ pigmentation [Oriented x3] : oriented x3 [No Focal Deficits] : no focal deficits [Normal Affect] : normal affect

## 2021-03-30 NOTE — ASSESSMENT
[FreeTextEntry1] : In summary the patient is a 65-year-old obese male with past medical history significant for asthma, hypertension, high cholesterol, history of obstructive sleep apnea in the past who presents for pulmonary consult post Covid pneumonia.  The patient's history and physical are consistent with obstructive sleep apnea.  A home sleep monitor has been ordered and the patient is instructed to follow-up in 2 weeks

## 2021-03-30 NOTE — HISTORY OF PRESENT ILLNESS
[Former] : former [Never] : never [TextBox_4] : Patient is a 65-year-old obese male with past medical history significant for hypertension, obstructive sleep apnea history of COVID-19 pneumonia who presents follow-up.  Patient recently underwent a home sleep monitor testing which revealed moderate obstructive sleep apnea with an apnea-hypopnea index of 27.1.\par \par Patient also presents complaining of low back pain which has remained since he developed COVID-19.  He denies fevers chills chest pain weight loss or

## 2021-04-13 ENCOUNTER — APPOINTMENT (OUTPATIENT)
Dept: PULMONOLOGY | Facility: CLINIC | Age: 66
End: 2021-04-13

## 2021-06-22 ENCOUNTER — RX RENEWAL (OUTPATIENT)
Age: 66
End: 2021-06-22

## 2021-08-31 ENCOUNTER — APPOINTMENT (OUTPATIENT)
Dept: PULMONOLOGY | Facility: CLINIC | Age: 66
End: 2021-08-31
Payer: COMMERCIAL

## 2021-08-31 VITALS
HEART RATE: 80 BPM | SYSTOLIC BLOOD PRESSURE: 143 MMHG | TEMPERATURE: 98 F | OXYGEN SATURATION: 95 % | HEIGHT: 69 IN | WEIGHT: 240 LBS | BODY MASS INDEX: 35.55 KG/M2 | RESPIRATION RATE: 14 BRPM | DIASTOLIC BLOOD PRESSURE: 89 MMHG

## 2021-08-31 DIAGNOSIS — Z87.01 PERSONAL HISTORY OF PNEUMONIA (RECURRENT): ICD-10-CM

## 2021-08-31 PROCEDURE — 99214 OFFICE O/P EST MOD 30 MIN: CPT

## 2021-08-31 NOTE — ASSESSMENT
[FreeTextEntry1] : In summary patient is a 66-year-old male with hypertension, obstructive sleep apnea who is status post COVID-19 who presents for follow-up. Patient's physical exam is significant for good air entry bilaterally. He does have a Mallampati score of 3. The patient is adamant about his inability to use his CPAP. I have issued a prescription to discontinue this. The patient is instructed to continue current therapy and follow-up in 3 months

## 2021-08-31 NOTE — HISTORY OF PRESENT ILLNESS
[Former] : former [Never] : never [TextBox_4] : In summary patient is a 66-year-old male with hypertension, obstructive sleep apnea, status post COVID-19 pneumonia who presents today for follow-up. Patient states that he is unable to comply with his CPAP. He denies any recent fevers chills chest pain weight loss or hemoptysis

## 2021-09-01 ENCOUNTER — APPOINTMENT (OUTPATIENT)
Dept: INTERNAL MEDICINE | Facility: CLINIC | Age: 66
End: 2021-09-01
Payer: COMMERCIAL

## 2021-09-01 VITALS
TEMPERATURE: 98 F | HEART RATE: 71 BPM | RESPIRATION RATE: 14 BRPM | BODY MASS INDEX: 35.55 KG/M2 | SYSTOLIC BLOOD PRESSURE: 131 MMHG | WEIGHT: 240 LBS | OXYGEN SATURATION: 95 % | DIASTOLIC BLOOD PRESSURE: 68 MMHG | HEIGHT: 69 IN

## 2021-09-01 PROCEDURE — 99214 OFFICE O/P EST MOD 30 MIN: CPT

## 2021-09-01 RX ORDER — CANAGLIFLOZIN 300 MG/1
300 TABLET, FILM COATED ORAL DAILY
Qty: 90 | Refills: 0 | Status: ACTIVE | COMMUNITY
Start: 2021-09-01 | End: 1900-01-01

## 2021-09-01 NOTE — HISTORY OF PRESENT ILLNESS
[de-identified] : PT COMES FOR F/U HTN ,DYSLIPIDEMIA AND DM ;HAS BEHZAD TO SEE ENDO IN 1 M \par STOP USING CPAP 1 WEEK AGO AND SLEEPS BETTER AND FEELS LESS TIRED DURING THE DAY

## 2021-09-01 NOTE — PHYSICAL EXAM
[Obese] : obese [Soft, Nontender] : the abdomen was soft and nontender [No Mass] : no masses were palpated [No HSM] : no hepatosplenomegaly noted [Coordination Grossly Intact] : coordination grossly intact [Normal] : affect was normal and insight and judgment were intact

## 2021-09-01 NOTE — ASSESSMENT
[FreeTextEntry1] : 66 Y OLD MALE WITH PMX OF HTN ,DYSLIPIDEMIA ,MILD OBESITY  AND DM = LABS AND RX ORDERED\par KALLIE= AS PER PULM

## 2021-09-02 LAB
CHOLEST SERPL-MCNC: 220 MG/DL
ESTIMATED AVERAGE GLUCOSE: 163 MG/DL
HBA1C MFR BLD HPLC: 7.3 %
HDLC SERPL-MCNC: 51 MG/DL
LDLC SERPL CALC-MCNC: NORMAL MG/DL
NONHDLC SERPL-MCNC: 169 MG/DL
TRIGL SERPL-MCNC: 401 MG/DL

## 2021-10-03 LAB
ALBUMIN SERPL ELPH-MCNC: 4.8 G/DL
ALP BLD-CCNC: 86 U/L
ALT SERPL-CCNC: 27 U/L
ANION GAP SERPL CALC-SCNC: 15 MMOL/L
AST SERPL-CCNC: 18 U/L
BILIRUB SERPL-MCNC: 0.2 MG/DL
BUN SERPL-MCNC: 22 MG/DL
CALCIUM SERPL-MCNC: 10.3 MG/DL
CHLORIDE SERPL-SCNC: 101 MMOL/L
CO2 SERPL-SCNC: 22 MMOL/L
CREAT SERPL-MCNC: 1.1 MG/DL
GLUCOSE SERPL-MCNC: 166 MG/DL
POTASSIUM SERPL-SCNC: 4.3 MMOL/L
PROT SERPL-MCNC: 7.4 G/DL
SODIUM SERPL-SCNC: 139 MMOL/L

## 2021-10-06 ENCOUNTER — NON-APPOINTMENT (OUTPATIENT)
Age: 66
End: 2021-10-06

## 2021-10-06 ENCOUNTER — APPOINTMENT (OUTPATIENT)
Dept: INTERNAL MEDICINE | Facility: CLINIC | Age: 66
End: 2021-10-06
Payer: COMMERCIAL

## 2021-10-06 VITALS
SYSTOLIC BLOOD PRESSURE: 137 MMHG | BODY MASS INDEX: 35.4 KG/M2 | DIASTOLIC BLOOD PRESSURE: 80 MMHG | OXYGEN SATURATION: 96 % | WEIGHT: 239 LBS | TEMPERATURE: 98.2 F | HEART RATE: 70 BPM | HEIGHT: 69 IN | RESPIRATION RATE: 14 BRPM

## 2021-10-06 DIAGNOSIS — Z01.818 ENCOUNTER FOR OTHER PREPROCEDURAL EXAMINATION: ICD-10-CM

## 2021-10-06 PROBLEM — U07.1 PNEUMONIA DUE TO COVID-19 VIRUS: Status: ACTIVE | Noted: 2021-03-03

## 2021-10-06 PROCEDURE — 99215 OFFICE O/P EST HI 40 MIN: CPT

## 2021-10-06 PROCEDURE — 93000 ELECTROCARDIOGRAM COMPLETE: CPT | Mod: NC

## 2021-10-06 NOTE — PHYSICAL EXAM
[No Acute Distress] : no acute distress [Well Nourished] : well nourished [Well Developed] : well developed [Well-Appearing] : well-appearing [Normal Sclera/Conjunctiva] : normal sclera/conjunctiva [PERRL] : pupils equal round and reactive to light [EOMI] : extraocular movements intact [No JVD] : no jugular venous distention [No Lymphadenopathy] : no lymphadenopathy [Supple] : supple [Thyroid Normal, No Nodules] : the thyroid was normal and there were no nodules present [No Respiratory Distress] : no respiratory distress  [No Accessory Muscle Use] : no accessory muscle use [Clear to Auscultation] : lungs were clear to auscultation bilaterally [Normal Rate] : normal rate  [Regular Rhythm] : with a regular rhythm [Normal S1, S2] : normal S1 and S2 [No Murmur] : no murmur heard [No Carotid Bruits] : no carotid bruits [No Abdominal Bruit] : a ~M bruit was not heard ~T in the abdomen [Pedal Pulses Present] : the pedal pulses are present [No Edema] : there was no peripheral edema [No Palpable Aorta] : no palpable aorta [No Extremity Clubbing/Cyanosis] : no extremity clubbing/cyanosis [Nonpitting Edema] : nonpitting edema present [Rt] : varicose veins of the right leg noted [Lt] : varicose veins of the left leg noted [Soft] : abdomen soft [Non Tender] : non-tender [Non-distended] : non-distended [No Masses] : no abdominal mass palpated [No HSM] : no HSM [Normal Bowel Sounds] : normal bowel sounds [Obese] : obese [Normal Posterior Cervical Nodes] : no posterior cervical lymphadenopathy [Normal Anterior Cervical Nodes] : no anterior cervical lymphadenopathy [No CVA Tenderness] : no CVA  tenderness [No Spinal Tenderness] : no spinal tenderness [No Joint Swelling] : no joint swelling [No Rash] : no rash [Coordination Grossly Intact] : coordination grossly intact [No Focal Deficits] : no focal deficits [Normal Affect] : the affect was normal [Normal Insight/Judgement] : insight and judgment were intact

## 2021-10-06 NOTE — ASSESSMENT
[Patient Optimized for Surgery] : Patient optimized for surgery [No Further Testing Recommended] : no further testing recommended [Continue medications as is] : Continue current medications [As per surgery] : as per surgery [FreeTextEntry4] : 66 Y OLD MALE WITH PMX OF HTN ,DM ,OBESITY AND KALLIE AT ACCEPTABLE RISK FOR SURGERY

## 2021-10-06 NOTE — HISTORY OF PRESENT ILLNESS
[No Adverse Anesthesia Reaction] : no adverse anesthesia reaction in self or family member [Diabetes] : diabetes [FreeTextEntry1] : CYSTOSCOPY AND URETHRAL DIL [FreeTextEntry2] : 10/18/21 [FreeTextEntry3] : DR NGUYEN CHACON

## 2021-10-07 LAB
ALBUMIN SERPL ELPH-MCNC: 5.1 G/DL
ALP BLD-CCNC: 77 U/L
ALT SERPL-CCNC: 37 U/L
ANION GAP SERPL CALC-SCNC: 16 MMOL/L
APPEARANCE: CLEAR
APTT BLD: 35.1 SEC
AST SERPL-CCNC: 21 U/L
BASOPHILS # BLD AUTO: 0.02 K/UL
BASOPHILS NFR BLD AUTO: 0.3 %
BILIRUB SERPL-MCNC: 0.2 MG/DL
BILIRUBIN URINE: NEGATIVE
BLOOD URINE: NEGATIVE
BUN SERPL-MCNC: 17 MG/DL
CALCIUM SERPL-MCNC: 10.1 MG/DL
CHLORIDE SERPL-SCNC: 101 MMOL/L
CO2 SERPL-SCNC: 21 MMOL/L
COLOR: NORMAL
CREAT SERPL-MCNC: 0.85 MG/DL
EOSINOPHIL # BLD AUTO: 0.16 K/UL
EOSINOPHIL NFR BLD AUTO: 2.5 %
ESTIMATED AVERAGE GLUCOSE: 177 MG/DL
GLUCOSE QUALITATIVE U: ABNORMAL
GLUCOSE SERPL-MCNC: 129 MG/DL
HBA1C MFR BLD HPLC: 7.8 %
HCT VFR BLD CALC: 48.4 %
HGB BLD-MCNC: 15.6 G/DL
IMM GRANULOCYTES NFR BLD AUTO: 0.2 %
INR PPP: 0.99 RATIO
KETONES URINE: NEGATIVE
LEUKOCYTE ESTERASE URINE: NEGATIVE
LYMPHOCYTES # BLD AUTO: 2.27 K/UL
LYMPHOCYTES NFR BLD AUTO: 35.8 %
MAN DIFF?: NORMAL
MCHC RBC-ENTMCNC: 30.1 PG
MCHC RBC-ENTMCNC: 32.2 GM/DL
MCV RBC AUTO: 93.4 FL
MONOCYTES # BLD AUTO: 0.5 K/UL
MONOCYTES NFR BLD AUTO: 7.9 %
NEUTROPHILS # BLD AUTO: 3.38 K/UL
NEUTROPHILS NFR BLD AUTO: 53.3 %
NITRITE URINE: NEGATIVE
PH URINE: 5
PLATELET # BLD AUTO: 214 K/UL
POTASSIUM SERPL-SCNC: 4.6 MMOL/L
PROT SERPL-MCNC: 7.7 G/DL
PROTEIN URINE: NEGATIVE
PT BLD: 11.8 SEC
RBC # BLD: 5.18 M/UL
RBC # FLD: 13.2 %
SODIUM SERPL-SCNC: 137 MMOL/L
SPECIFIC GRAVITY URINE: 1.03
UROBILINOGEN URINE: NORMAL
WBC # FLD AUTO: 6.34 K/UL

## 2021-10-20 ENCOUNTER — TRANSCRIPTION ENCOUNTER (OUTPATIENT)
Age: 66
End: 2021-10-20

## 2022-03-13 ENCOUNTER — RX RENEWAL (OUTPATIENT)
Age: 67
End: 2022-03-13

## 2022-03-30 ENCOUNTER — APPOINTMENT (OUTPATIENT)
Dept: INTERNAL MEDICINE | Facility: CLINIC | Age: 67
End: 2022-03-30
Payer: COMMERCIAL

## 2022-03-30 VITALS
SYSTOLIC BLOOD PRESSURE: 123 MMHG | WEIGHT: 237 LBS | DIASTOLIC BLOOD PRESSURE: 78 MMHG | HEIGHT: 69 IN | OXYGEN SATURATION: 96 % | BODY MASS INDEX: 35.1 KG/M2 | TEMPERATURE: 98 F | HEART RATE: 83 BPM

## 2022-03-30 VITALS — SYSTOLIC BLOOD PRESSURE: 138 MMHG | DIASTOLIC BLOOD PRESSURE: 78 MMHG

## 2022-03-30 PROCEDURE — 99397 PER PM REEVAL EST PAT 65+ YR: CPT

## 2022-03-30 PROCEDURE — 99212 OFFICE O/P EST SF 10 MIN: CPT

## 2022-03-30 RX ORDER — BLOOD-GLUCOSE METER
KIT MISCELLANEOUS
Qty: 1 | Refills: 0 | Status: ACTIVE | COMMUNITY
Start: 2022-03-30 | End: 1900-01-01

## 2022-03-30 RX ORDER — METOPROLOL SUCCINATE 25 MG/1
25 TABLET, EXTENDED RELEASE ORAL
Qty: 15 | Refills: 1 | Status: DISCONTINUED | COMMUNITY
Start: 2021-03-11 | End: 2022-03-30

## 2022-03-30 NOTE — HISTORY OF PRESENT ILLNESS
[de-identified] : COMES FOR CPE \par SEEN BY DR EDWARD AND  RECENTLY ,ON TRULICITY 1.5 WEEKLY FOR LAST 3 M OR SO \par CC FO LABILE HTN WITH -155 LAST 2 WEEKS ASSOCIATED WITH  HA \par OFF METOPROLOL FOR 10 M ;ONLY ON LOSARTAN 50 MG DAILY

## 2022-03-30 NOTE — PHYSICAL EXAM
[Obese] : obese [Coordination Grossly Intact] : coordination grossly intact [Normal] : affect was normal and insight and judgment were intact

## 2022-03-30 NOTE — ASSESSMENT
[FreeTextEntry1] : CPE OF 66 Y OLD MALE WITH PMX OF DM ,DYSLIPIDEMIA ,OBESITY ,BPH = LABS RECENTLY BY ENDO AND  \par LABILE HTN = BP MONITOR AND LOSARTAN 50 MG INCREASE FROM QD TO BID \par RTO 4 WEEKS

## 2022-04-13 ENCOUNTER — RX RENEWAL (OUTPATIENT)
Age: 67
End: 2022-04-13

## 2022-04-27 ENCOUNTER — APPOINTMENT (OUTPATIENT)
Dept: INTERNAL MEDICINE | Facility: CLINIC | Age: 67
End: 2022-04-27
Payer: COMMERCIAL

## 2022-04-27 VITALS
TEMPERATURE: 98.6 F | WEIGHT: 239 LBS | HEART RATE: 74 BPM | DIASTOLIC BLOOD PRESSURE: 82 MMHG | HEIGHT: 69 IN | RESPIRATION RATE: 16 BRPM | SYSTOLIC BLOOD PRESSURE: 136 MMHG | OXYGEN SATURATION: 96 % | BODY MASS INDEX: 35.4 KG/M2

## 2022-04-27 VITALS — SYSTOLIC BLOOD PRESSURE: 130 MMHG | DIASTOLIC BLOOD PRESSURE: 82 MMHG

## 2022-04-27 PROCEDURE — 99214 OFFICE O/P EST MOD 30 MIN: CPT

## 2022-04-27 NOTE — PHYSICAL EXAM
[Scattered Wheezes] : scattered wheezing was heard [Normal Rate] : normal rate  [Regular Rhythm] : with a regular rhythm [Normal S1, S2] : normal S1 and S2 [Soft] : abdomen soft [Non Tender] : non-tender [Normal] : affect was normal and insight and judgment were intact

## 2022-04-27 NOTE — HISTORY OF PRESENT ILLNESS
[de-identified] : COMES FOR F/U HTN ,ON HIS OWN TITRATED LOSARTAN TO 50 MG AM AND 25 MG PM ,WITH GOOD HOME READINGS \par CC OF COUGH AND WHEEZING FOR LAST 2 WEEKS ,HAD LOW GRADE FEVER FIRST 2 DAYS

## 2022-04-27 NOTE — ASSESSMENT
[FreeTextEntry1] : 66 Y OLD MALE WITH LABILE HTN = CONTINUE LOSARTAN  MG DAILY AS PER BP MONITORING \par DM = AS PER ENDO \par DYSLIPIDEMIA = ATORVASTATIN RX SENT \par POST URI COUGH AND WHEEZING = MUCINEX AND ALBUTEROL RX SENT \par PT IS LEAVING FOR [place] NEXT WEEK FOR 3V M \par RTO WHEN HE RETURNS

## 2022-05-15 ENCOUNTER — RX RENEWAL (OUTPATIENT)
Age: 67
End: 2022-05-15

## 2022-08-15 ENCOUNTER — APPOINTMENT (OUTPATIENT)
Dept: INTERNAL MEDICINE | Facility: CLINIC | Age: 67
End: 2022-08-15

## 2022-08-15 ENCOUNTER — NON-APPOINTMENT (OUTPATIENT)
Age: 67
End: 2022-08-15

## 2022-08-15 VITALS
RESPIRATION RATE: 16 BRPM | OXYGEN SATURATION: 98 % | WEIGHT: 240 LBS | DIASTOLIC BLOOD PRESSURE: 90 MMHG | HEIGHT: 69 IN | TEMPERATURE: 98.1 F | BODY MASS INDEX: 35.55 KG/M2 | SYSTOLIC BLOOD PRESSURE: 157 MMHG | HEART RATE: 73 BPM

## 2022-08-15 VITALS — SYSTOLIC BLOOD PRESSURE: 135 MMHG | DIASTOLIC BLOOD PRESSURE: 80 MMHG

## 2022-08-15 DIAGNOSIS — Z87.2 PERSONAL HISTORY OF DISEASES OF THE SKIN AND SUBCUTANEOUS TISSUE: ICD-10-CM

## 2022-08-15 DIAGNOSIS — Z87.898 PERSONAL HISTORY OF OTHER SPECIFIED CONDITIONS: ICD-10-CM

## 2022-08-15 DIAGNOSIS — Z86.69 PERSONAL HISTORY OF OTHER DISEASES OF THE NERVOUS SYSTEM AND SENSE ORGANS: ICD-10-CM

## 2022-08-15 DIAGNOSIS — L29.9 PRURITUS, UNSPECIFIED: ICD-10-CM

## 2022-08-15 LAB
APPEARANCE: CLEAR
BILIRUBIN URINE: NEGATIVE
BLOOD URINE: NEGATIVE
COLOR: NORMAL
GLUCOSE QUALITATIVE U: ABNORMAL
KETONES URINE: NEGATIVE
LEUKOCYTE ESTERASE URINE: NEGATIVE
NITRITE URINE: NEGATIVE
PH URINE: 5.5
PROTEIN URINE: NEGATIVE
SPECIFIC GRAVITY URINE: 1.03
UROBILINOGEN URINE: NORMAL

## 2022-08-15 PROCEDURE — 99397 PER PM REEVAL EST PAT 65+ YR: CPT

## 2022-08-15 PROCEDURE — 93000 ELECTROCARDIOGRAM COMPLETE: CPT

## 2022-08-15 RX ORDER — ALBUTEROL SULFATE 90 UG/1
108 (90 BASE) AEROSOL, METERED RESPIRATORY (INHALATION)
Qty: 1 | Refills: 3 | Status: DISCONTINUED | COMMUNITY
Start: 2018-04-13 | End: 2022-08-15

## 2022-08-15 RX ORDER — ALBUTEROL SULFATE 90 UG/1
108 (90 BASE) INHALANT RESPIRATORY (INHALATION)
Qty: 1 | Refills: 1 | Status: ACTIVE | COMMUNITY
Start: 2022-04-27 | End: 1900-01-01

## 2022-08-15 RX ORDER — GUAIFENESIN 600 MG/1
600 TABLET, EXTENDED RELEASE ORAL TWICE DAILY
Qty: 30 | Refills: 0 | Status: DISCONTINUED | COMMUNITY
Start: 2022-04-27 | End: 2022-08-15

## 2022-08-15 NOTE — HISTORY OF PRESENT ILLNESS
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

## 2022-08-15 NOTE — ASSESSMENT
[FreeTextEntry1] : CPE OF 67 Y OLD MALE WITH PMX OF HTN ,DM ,DYSLIPIDEMIA ,KALLIE AND OBESITY = LABS AND EKG \par S/P COVID-19 2ND TIME = RECOMM COVID-19 4 TH DOSE \par RTO 3 M

## 2022-08-16 LAB
25(OH)D3 SERPL-MCNC: 31.1 NG/ML
ALBUMIN SERPL ELPH-MCNC: 4.8 G/DL
ALP BLD-CCNC: 76 U/L
ALT SERPL-CCNC: 32 U/L
ANION GAP SERPL CALC-SCNC: 13 MMOL/L
AST SERPL-CCNC: 23 U/L
BASOPHILS # BLD AUTO: 0.03 K/UL
BASOPHILS NFR BLD AUTO: 0.5 %
BILIRUB SERPL-MCNC: 0.2 MG/DL
BUN SERPL-MCNC: 22 MG/DL
CALCIUM SERPL-MCNC: 10.5 MG/DL
CHLORIDE SERPL-SCNC: 103 MMOL/L
CHOLEST SERPL-MCNC: 188 MG/DL
CO2 SERPL-SCNC: 24 MMOL/L
CREAT SERPL-MCNC: 1.02 MG/DL
CREAT SPEC-SCNC: 75 MG/DL
EGFR: 81 ML/MIN/1.73M2
EOSINOPHIL # BLD AUTO: 0.18 K/UL
EOSINOPHIL NFR BLD AUTO: 2.8 %
ESTIMATED AVERAGE GLUCOSE: 166 MG/DL
GLUCOSE SERPL-MCNC: 113 MG/DL
HBA1C MFR BLD HPLC: 7.4 %
HCT VFR BLD CALC: 49.8 %
HDLC SERPL-MCNC: 51 MG/DL
HGB BLD-MCNC: 16 G/DL
IMM GRANULOCYTES NFR BLD AUTO: 0.2 %
LDLC SERPL CALC-MCNC: 84 MG/DL
LYMPHOCYTES # BLD AUTO: 1.89 K/UL
LYMPHOCYTES NFR BLD AUTO: 28.9 %
MAN DIFF?: NORMAL
MCHC RBC-ENTMCNC: 31.6 PG
MCHC RBC-ENTMCNC: 32.1 GM/DL
MCV RBC AUTO: 98.2 FL
MICROALBUMIN 24H UR DL<=1MG/L-MCNC: 2.9 MG/DL
MICROALBUMIN/CREAT 24H UR-RTO: 38 MG/G
MONOCYTES # BLD AUTO: 0.52 K/UL
MONOCYTES NFR BLD AUTO: 8 %
NEUTROPHILS # BLD AUTO: 3.91 K/UL
NEUTROPHILS NFR BLD AUTO: 59.6 %
NONHDLC SERPL-MCNC: 137 MG/DL
PLATELET # BLD AUTO: 248 K/UL
POTASSIUM SERPL-SCNC: 5.1 MMOL/L
PROT SERPL-MCNC: 7.3 G/DL
PSA FREE FLD-MCNC: 9 %
PSA FREE SERPL-MCNC: 0.04 NG/ML
PSA SERPL-MCNC: 0.4 NG/ML
RBC # BLD: 5.07 M/UL
RBC # FLD: 13.1 %
SODIUM SERPL-SCNC: 139 MMOL/L
TRIGL SERPL-MCNC: 264 MG/DL
TSH SERPL-ACNC: 1.7 UIU/ML
URATE SERPL-MCNC: 6 MG/DL
VIT B12 SERPL-MCNC: 352 PG/ML
WBC # FLD AUTO: 6.54 K/UL

## 2022-11-23 ENCOUNTER — APPOINTMENT (OUTPATIENT)
Dept: INTERNAL MEDICINE | Facility: CLINIC | Age: 67
End: 2022-11-23

## 2022-11-23 ENCOUNTER — NON-APPOINTMENT (OUTPATIENT)
Age: 67
End: 2022-11-23

## 2022-11-23 VITALS
HEIGHT: 69 IN | DIASTOLIC BLOOD PRESSURE: 88 MMHG | BODY MASS INDEX: 35.25 KG/M2 | RESPIRATION RATE: 16 BRPM | SYSTOLIC BLOOD PRESSURE: 130 MMHG | WEIGHT: 238 LBS | OXYGEN SATURATION: 97 % | HEART RATE: 70 BPM | TEMPERATURE: 97.6 F

## 2022-11-23 PROCEDURE — 93000 ELECTROCARDIOGRAM COMPLETE: CPT

## 2022-11-23 PROCEDURE — 99214 OFFICE O/P EST MOD 30 MIN: CPT

## 2022-11-23 RX ORDER — METFORMIN HYDROCHLORIDE 1000 MG/1
1000 TABLET, COATED ORAL
Qty: 180 | Refills: 0 | Status: ACTIVE | COMMUNITY
Start: 2022-08-25

## 2022-11-23 RX ORDER — DULAGLUTIDE 0.75 MG/.5ML
0.75 INJECTION, SOLUTION SUBCUTANEOUS
Qty: 6 | Refills: 0 | Status: DISCONTINUED | COMMUNITY
Start: 2021-11-22

## 2022-11-23 RX ORDER — BLOOD SUGAR DIAGNOSTIC
STRIP MISCELLANEOUS
Qty: 200 | Refills: 0 | Status: DISCONTINUED | COMMUNITY
Start: 2021-11-22

## 2022-11-23 NOTE — HISTORY OF PRESENT ILLNESS
[de-identified] : COMES FOR F/U \par HAD COVID-19 INF AGAIN 9/22;SINCE THEN BARAJAS AND FATIGUE \par CAME BACK FROM [place] YESTERDAY \par HAD INFLUENZA VACCINE

## 2022-11-23 NOTE — PHYSICAL EXAM
[Obese] : obese [No Rash] : no rash [Coordination Grossly Intact] : coordination grossly intact [Normal] : affect was normal and insight and judgment were intact

## 2022-11-23 NOTE — ASSESSMENT
[FreeTextEntry1] : 67 Y OLD MALE WITH PMX OF HTN ,DM ,DYSLIPIDEMIA AND HTN = LABS \par BARAJAS POST COVID= EKG ,CARDIO AND PULM EVAL

## 2022-11-25 LAB
ALBUMIN SERPL ELPH-MCNC: 4.5 G/DL
ALP BLD-CCNC: 83 U/L
ALT SERPL-CCNC: 26 U/L
ANION GAP SERPL CALC-SCNC: 13 MMOL/L
AST SERPL-CCNC: 21 U/L
BASOPHILS # BLD AUTO: 0.02 K/UL
BASOPHILS NFR BLD AUTO: 0.3 %
BILIRUB SERPL-MCNC: 0.3 MG/DL
BUN SERPL-MCNC: 21 MG/DL
CALCIUM SERPL-MCNC: 9.8 MG/DL
CHLORIDE SERPL-SCNC: 105 MMOL/L
CO2 SERPL-SCNC: 23 MMOL/L
CREAT SERPL-MCNC: 0.95 MG/DL
DEPRECATED D DIMER PPP IA-ACNC: <150 NG/ML DDU
EGFR: 88 ML/MIN/1.73M2
EOSINOPHIL # BLD AUTO: 0.18 K/UL
EOSINOPHIL NFR BLD AUTO: 3 %
ESTIMATED AVERAGE GLUCOSE: 148 MG/DL
GLUCOSE SERPL-MCNC: 146 MG/DL
HBA1C MFR BLD HPLC: 6.8 %
HCT VFR BLD CALC: 47.9 %
HGB BLD-MCNC: 15.2 G/DL
IMM GRANULOCYTES NFR BLD AUTO: 0.3 %
LYMPHOCYTES # BLD AUTO: 1.69 K/UL
LYMPHOCYTES NFR BLD AUTO: 28.2 %
MAN DIFF?: NORMAL
MCHC RBC-ENTMCNC: 31.5 PG
MCHC RBC-ENTMCNC: 31.7 GM/DL
MCV RBC AUTO: 99.2 FL
MONOCYTES # BLD AUTO: 0.6 K/UL
MONOCYTES NFR BLD AUTO: 10 %
NEUTROPHILS # BLD AUTO: 3.49 K/UL
NEUTROPHILS NFR BLD AUTO: 58.2 %
PLATELET # BLD AUTO: 230 K/UL
POTASSIUM SERPL-SCNC: 4.7 MMOL/L
PROT SERPL-MCNC: 6.9 G/DL
RBC # BLD: 4.83 M/UL
RBC # FLD: 14.6 %
SODIUM SERPL-SCNC: 141 MMOL/L
WBC # FLD AUTO: 6 K/UL

## 2022-11-29 LAB
CHOLEST SERPL-MCNC: 157 MG/DL
HDLC SERPL-MCNC: 43 MG/DL
LDLC SERPL CALC-MCNC: 43 MG/DL
NONHDLC SERPL-MCNC: 115 MG/DL
TRIGL SERPL-MCNC: 358 MG/DL

## 2022-12-06 ENCOUNTER — APPOINTMENT (OUTPATIENT)
Dept: PULMONOLOGY | Facility: CLINIC | Age: 67
End: 2022-12-06
Payer: COMMERCIAL

## 2022-12-08 ENCOUNTER — APPOINTMENT (OUTPATIENT)
Dept: HEART AND VASCULAR | Facility: CLINIC | Age: 67
End: 2022-12-08

## 2022-12-08 VITALS
HEIGHT: 69 IN | DIASTOLIC BLOOD PRESSURE: 76 MMHG | HEART RATE: 90 BPM | SYSTOLIC BLOOD PRESSURE: 130 MMHG | WEIGHT: 238 LBS | RESPIRATION RATE: 15 BRPM | TEMPERATURE: 97.8 F | BODY MASS INDEX: 35.25 KG/M2 | OXYGEN SATURATION: 97 %

## 2022-12-08 DIAGNOSIS — E78.1 PURE HYPERGLYCERIDEMIA: ICD-10-CM

## 2022-12-08 PROCEDURE — 93000 ELECTROCARDIOGRAM COMPLETE: CPT

## 2022-12-08 PROCEDURE — 99214 OFFICE O/P EST MOD 30 MIN: CPT | Mod: 25

## 2022-12-08 RX ORDER — TAMSULOSIN HYDROCHLORIDE 0.4 MG/1
0.4 CAPSULE ORAL
Qty: 90 | Refills: 0 | Status: ACTIVE | COMMUNITY
Start: 2021-10-20

## 2022-12-08 RX ORDER — MIRABEGRON 50 MG/1
50 TABLET, FILM COATED, EXTENDED RELEASE ORAL
Qty: 90 | Refills: 0 | Status: ACTIVE | COMMUNITY
Start: 2022-08-17

## 2022-12-08 NOTE — HISTORY OF PRESENT ILLNESS
[FreeTextEntry1] : REGINE CARLSON is a 67 year y.o. M with medical history significant for HTN (dx 2015), DM (dx 2015), KALLIE, ex-smoker (quit 1992).  He is here for cardiac evaluation of BARAJAS\par He states BARAJAS and decrease ET since having covid19 infection ~2 mo ago, while in [place]. \par He also reports intermittent leg edema for >1yr. \par Denies CP, SOB, palpitations, orthopnea, dizziness, syncope, LH\par He reports decrease in his exercise tolerance during the past 2 months. He can walk 2 blocks and can climb 1-2 flights of stairs. \par Sleeps with 1 pillow\par \par He denies history of MI, CVA\par Denies family history of premature ASCVD and /or SCD\par \par No hospitalizations in the past 3 years.\par \par \par \par ECHO (3/11/21): EF 50-55%. Mild concentric LVH. Abnormal septal motion. Trace TR PASP 23 mmHg \par \par ECG (11/23/22): NSR at 75 bpm with left axis, LAFB, LVH, PRWP and QRS widening (133ms) - No significant change when compared with prior. \par \par LHC (3/2021)\par FINDINGS\par LM= normal\par LAD= luminal, short portion of intramyocardial bridging\par LCx= luminal irregularities\par Ramus= large vessel, luminal\par RCA= luminal, dominant\par  \par LVEF:55 %\par LVEDP: 19 mmHg\par No AS/MR\par  \par \par LABS (11/23/22): ; ; LDL 43; HDL 43; NonHDL 115; Cre 0.95; K 4.7; eGFR 88; LFTs wnl; Hgb 15.2; Hct 47.9; A1c 6.8; D-dimer <150

## 2022-12-08 NOTE — ASSESSMENT
[FreeTextEntry1] : REGINE CARLSON is a 67 year M with past medical history significant for HTN, DM, HLD. He is here for c/o BARAJAS and decrease ET after having covid 19, as described above. ECG without significant change from prior. He had a cardiac cath 1.5 yrs ago that showed minimal disease \par - I reviewed today's ECG \par - I reviewed labs \par - Schedule an echo to evaluate for structural heart disease\par - continue taking cardiac meds \par - He is scheduled to see pulm next week \par - Increase ambulation as tolerated to aim for approximately 30 minutes of moderate activity 5 days a week.  Heart healthy diet low in sodium, sugars and saturated fats and high in fruits, vegetables and whole grains.  Weight loss.\par \par

## 2022-12-08 NOTE — REVIEW OF SYSTEMS
[Feeling Fatigued] : feeling fatigued [Dyspnea on exertion] : dyspnea during exertion [Lower Ext Edema] : lower extremity edema [Fever] : no fever [Headache] : no headache [Chills] : no chills [SOB] : no shortness of breath [Chest Discomfort] : no chest discomfort [Leg Claudication] : no intermittent leg claudication [Palpitations] : no palpitations [Orthopnea] : no orthopnea [PND] : no PND [Syncope] : no syncope [Cough] : no cough [Wheezing] : no wheezing [Coughing Up Blood] : no hemoptysis [Dizziness] : no dizziness [Convulsions] : no convulsions [Limb Weakness (Paresis)] : no limb weakness (Paresis) [Confusion] : no confusion was observed [Depression] : no depression [Anxiety] : no anxiety [Under Stress] : not under stress [Suicidal] : not suicidal [Easy Bleeding] : no tendency for easy bleeding

## 2022-12-15 ENCOUNTER — APPOINTMENT (OUTPATIENT)
Dept: PULMONOLOGY | Facility: CLINIC | Age: 67
End: 2022-12-15
Payer: COMMERCIAL

## 2022-12-15 ENCOUNTER — APPOINTMENT (OUTPATIENT)
Dept: HEART AND VASCULAR | Facility: CLINIC | Age: 67
End: 2022-12-15

## 2022-12-15 VITALS
TEMPERATURE: 98.9 F | OXYGEN SATURATION: 95 % | RESPIRATION RATE: 14 BRPM | DIASTOLIC BLOOD PRESSURE: 84 MMHG | SYSTOLIC BLOOD PRESSURE: 136 MMHG | HEART RATE: 87 BPM

## 2022-12-15 PROCEDURE — 99214 OFFICE O/P EST MOD 30 MIN: CPT | Mod: 25

## 2022-12-15 PROCEDURE — 94729 DIFFUSING CAPACITY: CPT

## 2022-12-15 PROCEDURE — 94726 PLETHYSMOGRAPHY LUNG VOLUMES: CPT

## 2022-12-15 PROCEDURE — 93306 TTE W/DOPPLER COMPLETE: CPT

## 2022-12-15 PROCEDURE — 94060 EVALUATION OF WHEEZING: CPT

## 2022-12-27 ENCOUNTER — RX RENEWAL (OUTPATIENT)
Age: 67
End: 2022-12-27

## 2023-01-05 NOTE — ASSESSMENT
[FreeTextEntry1] : In summary patient is a 67-year-old male with hypertension, obstructive sleep apnea who is status post COVID-19 who presents for follow-up. Patient's physical exam is significant for good air entry bilaterally. He does have a Mallampati score of 3. The patient is adamant about his inability to use his CPAP.  Repeat pulmonary function test ordered.  Patient is instructed to continue current therapy and follow-up in 3 months

## 2023-01-05 NOTE — HISTORY OF PRESENT ILLNESS
[Former] : former [Never] : never [TextBox_4] : Patient is a 67-year-old male with past medical history significant for hypertension obstructive sleep apnea former smoker status post COVID-19 who presents for follow-up.  Patient complains of cough shortness of breath and dyspnea on exertion.  He currently denies fevers chills chest pain weight loss or hemoptysis

## 2023-01-09 ENCOUNTER — APPOINTMENT (OUTPATIENT)
Dept: HEART AND VASCULAR | Facility: CLINIC | Age: 68
End: 2023-01-09

## 2023-01-09 ENCOUNTER — APPOINTMENT (OUTPATIENT)
Dept: INTERNAL MEDICINE | Facility: CLINIC | Age: 68
End: 2023-01-09
Payer: COMMERCIAL

## 2023-01-09 VITALS
RESPIRATION RATE: 14 BRPM | BODY MASS INDEX: 35.7 KG/M2 | HEART RATE: 92 BPM | WEIGHT: 241 LBS | OXYGEN SATURATION: 95 % | DIASTOLIC BLOOD PRESSURE: 88 MMHG | SYSTOLIC BLOOD PRESSURE: 138 MMHG | HEIGHT: 69 IN

## 2023-01-09 DIAGNOSIS — U09.9 POST COVID-19 CONDITION, UNSPECIFIED: ICD-10-CM

## 2023-01-09 DIAGNOSIS — R42 DIZZINESS AND GIDDINESS: ICD-10-CM

## 2023-01-09 PROCEDURE — 99214 OFFICE O/P EST MOD 30 MIN: CPT

## 2023-01-09 RX ORDER — MECLIZINE HYDROCHLORIDE 12.5 MG/1
12.5 TABLET ORAL
Qty: 30 | Refills: 0 | Status: ACTIVE | COMMUNITY
Start: 2023-01-09 | End: 1900-01-01

## 2023-01-09 NOTE — HISTORY OF PRESENT ILLNESS
[FreeTextEntry8] : CC OF NEW ONSET OF DIZZINESS;POOR BALANCE FOR 2 WEEKS THAT 1ST DEVELOPED WHILE WALKING ;NO LIGHTHEADEDNESS OR OTHER SX ;WORSE WHEN WALKING DOES NOT DEVELOPED AFTER GETTING UP IN THE MORNING OR WITH SUDDEN HEAD MOVEMENT \par SEEN BY ZELDA ,ON TRULICITY 1.5 MG WEEKLY LAST 3 WEEKS \par SEEN BY ROSSANA 4 M AGO WITH NL EXAM

## 2023-01-09 NOTE — ASSESSMENT
[FreeTextEntry1] : 67 Y OLD MALE WITH NEW ONSET OF VERTIGO- IMPAIR BALANCE FOR 2 WEEKS == CT HEAD ,NEUROLOGY EVAL AND MECLIZINE ORDERED\par DM = F/U ENDO \par BARAJAS = STABLE - SLIGHT IMPROVEMENT

## 2023-01-10 ENCOUNTER — APPOINTMENT (OUTPATIENT)
Dept: PULMONOLOGY | Facility: CLINIC | Age: 68
End: 2023-01-10
Payer: COMMERCIAL

## 2023-01-10 VITALS
OXYGEN SATURATION: 98 % | RESPIRATION RATE: 14 BRPM | SYSTOLIC BLOOD PRESSURE: 137 MMHG | HEART RATE: 92 BPM | TEMPERATURE: 97.9 F | DIASTOLIC BLOOD PRESSURE: 89 MMHG

## 2023-01-10 PROCEDURE — 99214 OFFICE O/P EST MOD 30 MIN: CPT

## 2023-01-10 NOTE — ASSESSMENT
[FreeTextEntry1] : In summary patient is a 67-year-old male with hypertension, obstructive sleep apnea who is status post COVID-19 who presents for follow-up. Patient's physical exam is significant for good air entry bilaterally. A repeat CT Chest has been ordered, Rx for Terelegy given and the patient is instructed to follow up in 4 months

## 2023-01-10 NOTE — HISTORY OF PRESENT ILLNESS
[Former] : former [Never] : never [TextBox_4] : Patient is a 67-year-old male past medical history significant for hypertension obstructive sleep apnea former smoker status post COVID-19 history of pulmonary nodule who presents today for follow-up.  The patient is noncompliant with CPAP.  He currently denies any recent fevers chills chest pain weight loss or hemoptysis

## 2023-03-29 ENCOUNTER — RX RENEWAL (OUTPATIENT)
Age: 68
End: 2023-03-29

## 2023-05-05 ENCOUNTER — APPOINTMENT (OUTPATIENT)
Dept: INTERNAL MEDICINE | Facility: CLINIC | Age: 68
End: 2023-05-05
Payer: COMMERCIAL

## 2023-05-05 VITALS
DIASTOLIC BLOOD PRESSURE: 83 MMHG | HEIGHT: 69 IN | HEART RATE: 81 BPM | SYSTOLIC BLOOD PRESSURE: 128 MMHG | RESPIRATION RATE: 15 BRPM | TEMPERATURE: 98 F | BODY MASS INDEX: 35.1 KG/M2 | OXYGEN SATURATION: 96 % | WEIGHT: 237 LBS

## 2023-05-05 DIAGNOSIS — S69.91XA UNSPECIFIED INJURY OF RIGHT WRIST, HAND AND FINGER(S), INITIAL ENCOUNTER: ICD-10-CM

## 2023-05-05 PROCEDURE — 99214 OFFICE O/P EST MOD 30 MIN: CPT

## 2023-05-05 RX ORDER — TIRZEPATIDE 5 MG/.5ML
5 INJECTION, SOLUTION SUBCUTANEOUS
Refills: 0 | Status: ACTIVE | COMMUNITY

## 2023-05-05 RX ORDER — DULAGLUTIDE 1.5 MG/.5ML
1.5 INJECTION, SOLUTION SUBCUTANEOUS
Qty: 6 | Refills: 0 | Status: DISCONTINUED | COMMUNITY
Start: 2022-08-25 | End: 2023-05-05

## 2023-05-05 NOTE — PHYSICAL EXAM
[No Acute Distress] : no acute distress [Obese] : obese [Normal] : normal [Soft, Nontender] : the abdomen was soft and nontender [No Mass] : no masses were palpated [No HSM] : no hepatosplenomegaly noted [No Focal Deficits] : no focal deficits [Alert and Oriented x3] : oriented to person, place, and time

## 2023-05-05 NOTE — ASSESSMENT
[FreeTextEntry1] : 67 Y OLD MALE WITH PMX OF HTN ,DM ,DYSLIPIDEMIA ,BPH AND OBESITY = LABS ORDERED\par F/U PULM ,CARDIO AND ENDO \par RTO 3 M

## 2023-05-05 NOTE — HISTORY OF PRESENT ILLNESS
[de-identified] : COMES FOR F/U \par HAD CT CHEST AND CT HEAD RECENTLY = RESULTS DISCUSSED IN DETAIL \par STARTED ON MOUNJARO 2 WEEKS AGO ;WILL SEE ENDO IN 3 WEEKS \par RETURNED FROM [place] 2 WEEKS AGO

## 2023-05-06 LAB
ALBUMIN SERPL ELPH-MCNC: 5.1 G/DL
ALP BLD-CCNC: 95 U/L
ALT SERPL-CCNC: 43 U/L
ANION GAP SERPL CALC-SCNC: 15 MMOL/L
AST SERPL-CCNC: 21 U/L
BILIRUB SERPL-MCNC: 0.3 MG/DL
BUN SERPL-MCNC: 21 MG/DL
CALCIUM SERPL-MCNC: 10.3 MG/DL
CHLORIDE SERPL-SCNC: 101 MMOL/L
CHOLEST SERPL-MCNC: 175 MG/DL
CO2 SERPL-SCNC: 23 MMOL/L
CREAT SERPL-MCNC: 0.99 MG/DL
EGFR: 84 ML/MIN/1.73M2
ESTIMATED AVERAGE GLUCOSE: 154 MG/DL
GLUCOSE SERPL-MCNC: 125 MG/DL
HBA1C MFR BLD HPLC: 7 %
HDLC SERPL-MCNC: 50 MG/DL
LDLC SERPL CALC-MCNC: 83 MG/DL
NONHDLC SERPL-MCNC: 125 MG/DL
POTASSIUM SERPL-SCNC: 4.9 MMOL/L
PROT SERPL-MCNC: 7.9 G/DL
SODIUM SERPL-SCNC: 140 MMOL/L
TRIGL SERPL-MCNC: 209 MG/DL

## 2023-05-08 ENCOUNTER — APPOINTMENT (OUTPATIENT)
Dept: HEART AND VASCULAR | Facility: CLINIC | Age: 68
End: 2023-05-08
Payer: COMMERCIAL

## 2023-05-08 VITALS
HEIGHT: 69 IN | RESPIRATION RATE: 16 BRPM | DIASTOLIC BLOOD PRESSURE: 74 MMHG | TEMPERATURE: 84 F | BODY MASS INDEX: 35.1 KG/M2 | OXYGEN SATURATION: 96 % | WEIGHT: 237 LBS | HEART RATE: 84 BPM | SYSTOLIC BLOOD PRESSURE: 115 MMHG

## 2023-05-08 PROCEDURE — 99214 OFFICE O/P EST MOD 30 MIN: CPT

## 2023-05-08 NOTE — HISTORY OF PRESENT ILLNESS
[FreeTextEntry1] : REGINE CARLSON is a 67 year y.o. M with medical history significant for HTN (dx 2015), DM (dx 2015), KALLIE, ex-smoker (quit 1992).  He is here for follow-up of BARAJAS and leg edema \par Echo showed normal LV function, LVH, Grade IDD and no significant valve disease \par He states BARAJAS and decrease ET since having covid19 infection ~2 mo ago, while in [place]. \par He also reports intermittent leg edema for >1yr. \par Denies CP, SOB, palpitations, orthopnea, dizziness, syncope, LH\par He reports decrease in his exercise tolerance during the past 2 months. He can walk 2 blocks and can climb 1-2 flights of stairs. \par Sleeps with 1 pillow\par \par He denies history of MI, CVA\par Denies family history of premature ASCVD and /or SCD\par \par No hospitalizations in the past 3 years.\par \par \par DATA \par ECHO (12/15/22): LV endocardium is not well visualized. Grossly normal LV function. EF 64%. Concentric LVH. Grade IDD. Mild TR\par ECHO (3/11/21): EF 50-55%. Mild concentric LVH. Abnormal septal motion. Trace TR PASP 23 mmHg \par \par ECG (11/23/22): NSR at 75 bpm with left axis, LAFB, LVH, PRWP and QRS widening (133ms) - No significant change when compared with prior. \par \par CT chest (4/28/23): The heart is not enlarged. There is mild coronary artery calcification. There is no pericardial effusion. THer aorta and pulmonary arteris are normal in size and outline. Small lung nodules. LL mild interstitial lung disease compatible with UIP. \par \par LHC (3/2021)\par FINDINGS\par LM= normal\par LAD= luminal, short portion of intramyocardial bridging\par LCx= luminal irregularities\par Ramus= large vessel, luminal\par RCA= luminal, dominant\par  \par LVEF:55 %\par LVEDP: 19 mmHg\par No AS/MR\par  \par LABS (05/05/23) A1C 7.0, , , HDL 50, LDL 83, NON-, K 4.9, CRE 0.99, LFTs wnl, eGFR 84.\par LABS (11/23/22): ; ; LDL 43; HDL 43; NonHDL 115; Cre 0.95; K 4.7; eGFR 88; LFTs wnl; Hgb 15.2; Hct 47.9; A1c 6.8; D-dimer <150

## 2023-05-08 NOTE — ASSESSMENT
[FreeTextEntry1] : REGINE CARLSON is a 67 year M with past medical history significant for HTN, DM, HLD. He is here for c/o BARAJAS and decrease ET after having covid 19, as described above. He is here for follow-up of BARAJAS and leg edema. Echo showed diastolic dysfunction, with preserved LV function and no significant valve disease. He had a cardiac cath ~2yrs ago that showed minimal disease. Symptoms may be multifactorial, HFpEF, deconditioning and pulmonary \par - I reviewed ECG \par - I reviewed labs \par - I reviewed echo report and discussed the results with the patient --> Diastolic dysfunction \par - add lasix 20 mg daily \par - continue taking other cardiac meds (losartan, atorvastatin)\par - He is also seeing pulm  \par - Increase ambulation as tolerated to aim for approximately 30 minutes of moderate activity 5 days a week.  Heart healthy diet low in sodium, sugars and saturated fats and high in fruits, vegetables and whole grains.  Weight loss.\par \par \par \par Patient advised to go to the nearest ED whenever any symptoms persist and/or worsens.  Patient/Family/Caregiver verbalized complete understanding of these instructions.\par \par I spent a total of 30 minutes with more than 50% spent on counseling and coordinating care.\par \par \par RTO in 3 wks

## 2023-05-11 ENCOUNTER — APPOINTMENT (OUTPATIENT)
Dept: PULMONOLOGY | Facility: CLINIC | Age: 68
End: 2023-05-11
Payer: COMMERCIAL

## 2023-05-11 VITALS
RESPIRATION RATE: 14 BRPM | OXYGEN SATURATION: 96 % | HEART RATE: 85 BPM | TEMPERATURE: 98.1 F | DIASTOLIC BLOOD PRESSURE: 79 MMHG | SYSTOLIC BLOOD PRESSURE: 123 MMHG

## 2023-05-11 PROCEDURE — 99214 OFFICE O/P EST MOD 30 MIN: CPT

## 2023-05-12 LAB — FRUCTOSAMINE SERPL-MCNC: 293 UMOL/L

## 2023-05-18 NOTE — HISTORY OF PRESENT ILLNESS
[Former] : former [Never] : never [TextBox_4] : Patient is a 67-year-old male past medical history significant for hypertension diabetes obstructive sleep apnea currently on CPAP former smoker who presents complaining of worsening cough shortness of breath and dyspnea on exertion with associated bilateral leg edema

## 2023-05-18 NOTE — ASSESSMENT
[FreeTextEntry1] : In summary patient is a 67-year-old male with hypertension, obstructive sleep apnea who is status post COVID-19 who presents for follow-up. Patient's physical exam is significant for good air entry bilaterally. CT chest reviewed with patient . Patient is instructed to continue current medications and to follow up in 4 months

## 2023-05-25 ENCOUNTER — APPOINTMENT (OUTPATIENT)
Dept: HEART AND VASCULAR | Facility: CLINIC | Age: 68
End: 2023-05-25
Payer: COMMERCIAL

## 2023-05-25 VITALS
HEIGHT: 69 IN | SYSTOLIC BLOOD PRESSURE: 119 MMHG | HEART RATE: 95 BPM | DIASTOLIC BLOOD PRESSURE: 76 MMHG | RESPIRATION RATE: 16 BRPM | BODY MASS INDEX: 35.1 KG/M2 | OXYGEN SATURATION: 94 % | WEIGHT: 237 LBS | TEMPERATURE: 98.4 F

## 2023-05-25 PROCEDURE — 99213 OFFICE O/P EST LOW 20 MIN: CPT

## 2023-05-25 NOTE — HISTORY OF PRESENT ILLNESS
[FreeTextEntry1] : REGINE CARLSON is a 67 year y.o. M with medical history significant for HTN (dx 2015), DM (dx 2015), s/p covid 19 (1/2023), KALLIE, ex-smoker (quit 1992).  He is here for follow-up of BARAJAS and leg edema \par Echo showed normal LV function, LVH, Grade IDD and no significant valve disease \par He started a trial of lasix 20 mg and states he feels much better. \par leg edema has resolved and BARAJAS has improved. \par  \par Denies CP, SOB, palpitations, orthopnea, dizziness, syncope, LH, edema \par He reports decrease in his exercise tolerance during the past 2 months. He can walk 2 blocks and can climb 1-2 flights of stairs. \par Sleeps with 1 pillow\par \par He denies history of MI, CVA\par Denies family history of premature ASCVD and /or SCD\par \par No hospitalizations in the past 3 years.\par \par \par DATA \par ECHO (12/15/22): LV endocardium is not well visualized. Grossly normal LV function. EF 64%. Concentric LVH. Grade IDD. Mild TR\par ECHO (3/11/21): EF 50-55%. Mild concentric LVH. Abnormal septal motion. Trace TR PASP 23 mmHg \par \par ECG (11/23/22): NSR at 75 bpm with left axis, LAFB, LVH, PRWP and QRS widening (133ms) - No significant change when compared with prior. \par \par CT chest (4/28/23): The heart is not enlarged. There is mild coronary artery calcification. There is no pericardial effusion. THer aorta and pulmonary arteris are normal in size and outline. Small lung nodules. LL mild interstitial lung disease compatible with UIP. \par \par LHC (3/2021)\par FINDINGS\par LM= normal\par LAD= luminal, short portion of intramyocardial bridging\par LCx= luminal irregularities\par Ramus= large vessel, luminal\par RCA= luminal, dominant\par  \par LVEF:55 %\par LVEDP: 19 mmHg\par No AS/MR\par  \par LABS (05/05/23) A1C 7.0, , , HDL 50, LDL 83, NON-, K 4.9, CRE 0.99, LFTs wnl, eGFR 84.\par LABS (11/23/22): ; ; LDL 43; HDL 43; NonHDL 115; Cre 0.95; K 4.7; eGFR 88; LFTs wnl; Hgb 15.2; Hct 47.9; A1c 6.8; D-dimer <150

## 2023-05-25 NOTE — ASSESSMENT
[FreeTextEntry1] : REGINE CARLSON is a 67 year M with past medical history significant for HTN, diastolic dysfunction, DM, HLD. He is here for follow-up of BARAJAS/leg edema. He had a cardiac cath ~2yrs ago that showed minimal disease. He has normal LV function and no valve disease. He is feeling better. No new cardiac complaints at this time.  \par - I reviewed ECG \par - I reviewed labs \par - I reviewed echo report and discussed the results with the patient --> Diastolic dysfunction \par - continue lasix 20 mg PRN \par - continue taking other cardiac meds (losartan, atorvastatin)\par - He is also seeing pulm  \par - Increase ambulation as tolerated to aim for approximately 30 minutes of moderate activity 5 days a week.  Heart healthy diet low in sodium, sugars and saturated fats and high in fruits, vegetables and whole grains.  Weight loss.\par \par \par \par Patient advised to go to the nearest ED whenever any symptoms persist and/or worsens.  Patient/Family/Caregiver verbalized complete understanding of these instructions.\par \par I spent a total of 30 minutes with more than 50% spent on counseling and coordinating care.\par \par \par RTO in 4 mo

## 2023-06-01 ENCOUNTER — APPOINTMENT (OUTPATIENT)
Dept: PULMONOLOGY | Facility: CLINIC | Age: 68
End: 2023-06-01
Payer: COMMERCIAL

## 2023-06-01 VITALS
OXYGEN SATURATION: 94 % | TEMPERATURE: 98.1 F | SYSTOLIC BLOOD PRESSURE: 130 MMHG | HEART RATE: 76 BPM | DIASTOLIC BLOOD PRESSURE: 83 MMHG | RESPIRATION RATE: 14 BRPM

## 2023-06-01 DIAGNOSIS — Z87.09 PERSONAL HISTORY OF OTHER DISEASES OF THE RESPIRATORY SYSTEM: ICD-10-CM

## 2023-06-01 DIAGNOSIS — R06.02 SHORTNESS OF BREATH: ICD-10-CM

## 2023-06-01 PROCEDURE — 99214 OFFICE O/P EST MOD 30 MIN: CPT

## 2023-06-01 NOTE — HISTORY OF PRESENT ILLNESS
[Former] : former [Never] : never [TextBox_4] : Patient is a 67-year-old male past medical history significant for hypertension obstructive sleep apnea congestive heart failure with preserved ejection fraction diabetes dyslipidemia who presents today for follow-up.  Patient states that his cough shortness of breath and dyspnea on exertion have improved.  He currently denies fevers chills chest pain weight loss or hemoptysis

## 2023-07-11 ENCOUNTER — RX RENEWAL (OUTPATIENT)
Age: 68
End: 2023-07-11

## 2023-09-11 ENCOUNTER — APPOINTMENT (OUTPATIENT)
Dept: INTERNAL MEDICINE | Facility: CLINIC | Age: 68
End: 2023-09-11
Payer: COMMERCIAL

## 2023-09-11 ENCOUNTER — NON-APPOINTMENT (OUTPATIENT)
Age: 68
End: 2023-09-11

## 2023-09-11 VITALS
HEART RATE: 79 BPM | TEMPERATURE: 98.4 F | RESPIRATION RATE: 14 BRPM | SYSTOLIC BLOOD PRESSURE: 118 MMHG | BODY MASS INDEX: 32.58 KG/M2 | HEIGHT: 69 IN | WEIGHT: 220 LBS | DIASTOLIC BLOOD PRESSURE: 74 MMHG | OXYGEN SATURATION: 96 %

## 2023-09-11 DIAGNOSIS — E79.0 HYPERURICEMIA W/OUT SIGNS OF INFLAMMATORY ARTHRITIS AND TOPHACEOUS DISEASE: ICD-10-CM

## 2023-09-11 DIAGNOSIS — N40.0 BENIGN PROSTATIC HYPERPLASIA WITHOUT LOWER URINARY TRACT SYMPMS: ICD-10-CM

## 2023-09-11 DIAGNOSIS — Z00.00 ENCOUNTER FOR GENERAL ADULT MEDICAL EXAMINATION W/OUT ABNORMAL FINDINGS: ICD-10-CM

## 2023-09-11 LAB
PSA FREE FLD-MCNC: 10 %
PSA FREE SERPL-MCNC: 0.06 NG/ML
PSA SERPL-MCNC: 0.53 NG/ML

## 2023-09-11 PROCEDURE — 93000 ELECTROCARDIOGRAM COMPLETE: CPT

## 2023-09-11 PROCEDURE — 99397 PER PM REEVAL EST PAT 65+ YR: CPT | Mod: 25

## 2023-09-12 LAB
ALBUMIN SERPL ELPH-MCNC: 4.8 G/DL
ALP BLD-CCNC: 76 U/L
ALT SERPL-CCNC: 26 U/L
ANION GAP SERPL CALC-SCNC: 16 MMOL/L
APPEARANCE: CLEAR
AST SERPL-CCNC: 18 U/L
BILIRUB SERPL-MCNC: 0.3 MG/DL
BILIRUBIN URINE: NEGATIVE
BLOOD URINE: NEGATIVE
BUN SERPL-MCNC: 19 MG/DL
CALCIUM SERPL-MCNC: 9.9 MG/DL
CHLORIDE SERPL-SCNC: 103 MMOL/L
CHOLEST SERPL-MCNC: 147 MG/DL
CO2 SERPL-SCNC: 23 MMOL/L
COLOR: YELLOW
CREAT SERPL-MCNC: 0.98 MG/DL
CREAT SPEC-SCNC: 77 MG/DL
EGFR: 85 ML/MIN/1.73M2
ESTIMATED AVERAGE GLUCOSE: 128 MG/DL
GLUCOSE QUALITATIVE U: >=1000 MG/DL
GLUCOSE SERPL-MCNC: 164 MG/DL
HBA1C MFR BLD HPLC: 6.1 %
HCT VFR BLD CALC: 45.2 %
HDLC SERPL-MCNC: 51 MG/DL
HGB BLD-MCNC: 15 G/DL
KETONES URINE: NEGATIVE MG/DL
LDLC SERPL CALC-MCNC: 67 MG/DL
LEUKOCYTE ESTERASE URINE: NEGATIVE
MCHC RBC-ENTMCNC: 31.9 PG
MCHC RBC-ENTMCNC: 33.2 GM/DL
MCV RBC AUTO: 96.2 FL
MICROALBUMIN 24H UR DL<=1MG/L-MCNC: <1.2 MG/DL
MICROALBUMIN/CREAT 24H UR-RTO: NORMAL MG/G
NITRITE URINE: NEGATIVE
NONHDLC SERPL-MCNC: 96 MG/DL
PH URINE: 6
PLATELET # BLD AUTO: 244 K/UL
POTASSIUM SERPL-SCNC: 4.8 MMOL/L
PROT SERPL-MCNC: 7.7 G/DL
PROTEIN URINE: NEGATIVE MG/DL
RBC # BLD: 4.7 M/UL
RBC # FLD: 13.7 %
SODIUM SERPL-SCNC: 142 MMOL/L
SPECIFIC GRAVITY URINE: >1.03
TRIGL SERPL-MCNC: 171 MG/DL
TSH SERPL-ACNC: 1.26 UIU/ML
URATE SERPL-MCNC: 5.9 MG/DL
UROBILINOGEN URINE: 0.2 MG/DL
VIT B12 SERPL-MCNC: 709 PG/ML
WBC # FLD AUTO: 9.18 K/UL

## 2023-09-14 ENCOUNTER — APPOINTMENT (OUTPATIENT)
Dept: HEART AND VASCULAR | Facility: CLINIC | Age: 68
End: 2023-09-14
Payer: COMMERCIAL

## 2023-09-14 VITALS
HEIGHT: 69 IN | HEART RATE: 75 BPM | OXYGEN SATURATION: 96 % | RESPIRATION RATE: 14 BRPM | SYSTOLIC BLOOD PRESSURE: 132 MMHG | TEMPERATURE: 98.1 F | DIASTOLIC BLOOD PRESSURE: 80 MMHG | WEIGHT: 220 LBS | BODY MASS INDEX: 32.58 KG/M2

## 2023-09-14 DIAGNOSIS — I10 ESSENTIAL (PRIMARY) HYPERTENSION: ICD-10-CM

## 2023-09-14 DIAGNOSIS — I50.30 UNSPECIFIED DIASTOLIC (CONGESTIVE) HEART FAILURE: ICD-10-CM

## 2023-09-14 DIAGNOSIS — R94.31 ABNORMAL ELECTROCARDIOGRAM [ECG] [EKG]: ICD-10-CM

## 2023-09-14 PROCEDURE — 99214 OFFICE O/P EST MOD 30 MIN: CPT

## 2023-09-18 ENCOUNTER — RX RENEWAL (OUTPATIENT)
Age: 68
End: 2023-09-18

## 2023-09-21 LAB — 25(OH)D3 SERPL-MCNC: 26.8 NG/ML

## 2024-01-04 ENCOUNTER — RX RENEWAL (OUTPATIENT)
Age: 69
End: 2024-01-04

## 2024-01-29 ENCOUNTER — RX RENEWAL (OUTPATIENT)
Age: 69
End: 2024-01-29

## 2024-03-04 ENCOUNTER — APPOINTMENT (OUTPATIENT)
Dept: INTERNAL MEDICINE | Facility: CLINIC | Age: 69
End: 2024-03-04
Payer: COMMERCIAL

## 2024-03-04 VITALS
HEART RATE: 82 BPM | TEMPERATURE: 98 F | OXYGEN SATURATION: 96 % | SYSTOLIC BLOOD PRESSURE: 134 MMHG | WEIGHT: 222 LBS | HEIGHT: 69 IN | RESPIRATION RATE: 14 BRPM | BODY MASS INDEX: 32.88 KG/M2 | DIASTOLIC BLOOD PRESSURE: 76 MMHG

## 2024-03-04 DIAGNOSIS — E11.69 TYPE 2 DIABETES MELLITUS WITH OTHER SPECIFIED COMPLICATION: ICD-10-CM

## 2024-03-04 DIAGNOSIS — E78.5 HYPERLIPIDEMIA, UNSPECIFIED: ICD-10-CM

## 2024-03-04 DIAGNOSIS — E66.9 OBESITY, UNSPECIFIED: ICD-10-CM

## 2024-03-04 DIAGNOSIS — E66.9 TYPE 2 DIABETES MELLITUS WITH OTHER SPECIFIED COMPLICATION: ICD-10-CM

## 2024-03-04 DIAGNOSIS — J45.909 UNSPECIFIED ASTHMA, UNCOMPLICATED: ICD-10-CM

## 2024-03-04 PROCEDURE — 99214 OFFICE O/P EST MOD 30 MIN: CPT

## 2024-03-04 NOTE — PHYSICAL EXAM
[No Acute Distress] : no acute distress [Normal Sclera/Conjunctiva] : normal sclera/conjunctiva [Normal Outer Ear/Nose] : the outer ears and nose were normal in appearance [Normal] : normal rate, regular rhythm, normal S1 and S2 and no murmur heard [No Edema] : there was no peripheral edema [Soft] : abdomen soft [Non Tender] : non-tender [Normal Bowel Sounds] : normal bowel sounds [Normal Anterior Cervical Nodes] : no anterior cervical lymphadenopathy [Alert and Oriented x3] : oriented to person, place, and time

## 2024-03-04 NOTE — ASSESSMENT
[FreeTextEntry1] : 68 Y OLD MALE WITH PMX OF HTN ,DM ,DYSLIPIDEMIA ,ASTHMA AND OBESITY = ABS REVIEWED RTO 3-4 M

## 2024-03-04 NOTE — HISTORY OF PRESENT ILLNESS
[de-identified] : SEEN BY ENDO 2/29/24 WITH LABS  WEGOVY ON BACK ORDER  GOING TO [place] FOR 1 Y ANIVERSARY OF FATHER'S DEATH

## 2024-03-05 ENCOUNTER — APPOINTMENT (OUTPATIENT)
Dept: PULMONOLOGY | Facility: CLINIC | Age: 69
End: 2024-03-05
Payer: COMMERCIAL

## 2024-03-05 VITALS
BODY MASS INDEX: 32.88 KG/M2 | DIASTOLIC BLOOD PRESSURE: 67 MMHG | RESPIRATION RATE: 14 BRPM | HEIGHT: 69 IN | SYSTOLIC BLOOD PRESSURE: 132 MMHG | OXYGEN SATURATION: 97 % | WEIGHT: 222 LBS | HEART RATE: 79 BPM

## 2024-03-05 DIAGNOSIS — Z85.46 PERSONAL HISTORY OF MALIGNANT NEOPLASM OF PROSTATE: ICD-10-CM

## 2024-03-05 DIAGNOSIS — Z86.39 PERSONAL HISTORY OF OTHER ENDOCRINE, NUTRITIONAL AND METABOLIC DISEASE: ICD-10-CM

## 2024-03-05 DIAGNOSIS — R91.1 SOLITARY PULMONARY NODULE: ICD-10-CM

## 2024-03-05 DIAGNOSIS — Z86.79 PERSONAL HISTORY OF OTHER DISEASES OF THE CIRCULATORY SYSTEM: ICD-10-CM

## 2024-03-05 DIAGNOSIS — Z87.891 PERSONAL HISTORY OF NICOTINE DEPENDENCE: ICD-10-CM

## 2024-03-05 DIAGNOSIS — G47.33 OBSTRUCTIVE SLEEP APNEA (ADULT) (PEDIATRIC): ICD-10-CM

## 2024-03-05 PROCEDURE — 99213 OFFICE O/P EST LOW 20 MIN: CPT | Mod: 1L

## 2024-03-05 RX ORDER — BUDESONIDE, GLYCOPYRROLATE, AND FORMOTEROL FUMARATE 160; 9; 4.8 UG/1; UG/1; UG/1
160-9-4.8 AEROSOL, METERED RESPIRATORY (INHALATION) TWICE DAILY
Qty: 1 | Refills: 6 | Status: ACTIVE | COMMUNITY
Start: 2024-03-05 | End: 1900-01-01

## 2024-03-05 RX ORDER — AZITHROMYCIN 250 MG/1
250 TABLET, FILM COATED ORAL
Qty: 6 | Refills: 0 | Status: ACTIVE | COMMUNITY
Start: 2024-03-05 | End: 1900-01-01

## 2024-03-08 ENCOUNTER — RX RENEWAL (OUTPATIENT)
Age: 69
End: 2024-03-08

## 2024-03-18 ENCOUNTER — RX RENEWAL (OUTPATIENT)
Age: 69
End: 2024-03-18

## 2024-04-15 ENCOUNTER — RX RENEWAL (OUTPATIENT)
Age: 69
End: 2024-04-15

## 2024-05-23 ENCOUNTER — RX RENEWAL (OUTPATIENT)
Age: 69
End: 2024-05-23

## 2024-05-23 RX ORDER — FUROSEMIDE 20 MG/1
20 TABLET ORAL
Qty: 30 | Refills: 0 | Status: ACTIVE | COMMUNITY
Start: 2023-05-08 | End: 1900-01-01

## 2024-05-31 PROBLEM — R91.1 PULMONARY NODULE: Status: ACTIVE | Noted: 2023-01-10

## 2024-05-31 PROBLEM — G47.33 OBSTRUCTIVE SLEEP APNEA: Status: ACTIVE | Noted: 2021-03-30

## 2024-05-31 NOTE — ASSESSMENT
[FreeTextEntry1] : In summary patient is a 68-year-old male with hypertension, obstructive sleep apnea who is status post COVID-19 who presents for follow-up. Patient's physical exam is significant for good air entry bilaterally. CT chest reviewed with patient . Patient is instructed to continue current medications and to follow up in 4 months

## 2024-05-31 NOTE — HISTORY OF PRESENT ILLNESS
[Former] : former [Never] : never [TextBox_4] : Patient is a 68-year-old male past medical history significant for hypertension obstructive sleep apnea congestive heart failure with preserved ejection fraction diabetes dyslipidemia who presents today for follow-up. Patient states that his cough shortness of breath and dyspnea on exertion have improved. He currently denies fevers chills chest pain weight loss or hemoptysis.

## 2024-06-09 ENCOUNTER — RX RENEWAL (OUTPATIENT)
Age: 69
End: 2024-06-09

## 2024-06-09 RX ORDER — LOSARTAN POTASSIUM 50 MG/1
50 TABLET, FILM COATED ORAL
Qty: 180 | Refills: 0 | Status: ACTIVE | COMMUNITY
Start: 2018-08-09 | End: 1900-01-01

## 2024-08-01 ENCOUNTER — APPOINTMENT (OUTPATIENT)
Age: 69
End: 2024-08-01
Payer: COMMERCIAL

## 2024-08-01 ENCOUNTER — NON-APPOINTMENT (OUTPATIENT)
Age: 69
End: 2024-08-01

## 2024-08-01 VITALS
DIASTOLIC BLOOD PRESSURE: 73 MMHG | RESPIRATION RATE: 16 BRPM | BODY MASS INDEX: 31.84 KG/M2 | OXYGEN SATURATION: 98 % | WEIGHT: 215 LBS | HEART RATE: 88 BPM | TEMPERATURE: 97 F | HEIGHT: 69 IN | SYSTOLIC BLOOD PRESSURE: 107 MMHG

## 2024-08-01 DIAGNOSIS — I10 ESSENTIAL (PRIMARY) HYPERTENSION: ICD-10-CM

## 2024-08-01 DIAGNOSIS — I50.30 UNSPECIFIED DIASTOLIC (CONGESTIVE) HEART FAILURE: ICD-10-CM

## 2024-08-01 DIAGNOSIS — E78.5 HYPERLIPIDEMIA, UNSPECIFIED: ICD-10-CM

## 2024-08-01 DIAGNOSIS — R06.02 SHORTNESS OF BREATH: ICD-10-CM

## 2024-08-01 PROCEDURE — 93000 ELECTROCARDIOGRAM COMPLETE: CPT

## 2024-08-01 PROCEDURE — 99214 OFFICE O/P EST MOD 30 MIN: CPT | Mod: 25

## 2024-08-01 NOTE — ASSESSMENT
[FreeTextEntry1] : REGINE CARLSON is a 68 year M with past medical history significant for HTN, diastolic dysfunction, DM, HLD, HFpEF. He is here for follow-up. He had a cardiac cath ~2yrs ago that showed minimal disease, LAD myocardial bridge. He has normal LV function and no valve disease. He reports BARAJAS, fatigue.    - I reviewed ECG --> No significant change when compared with prior) - stop losartan and lasix. Start entresto 24-26 mg bid.  - continue taking other cardiac meds (atorvastatin) - Increase ambulation as tolerated to aim for approximately 30 minutes of moderate activity 5 days a week.  Heart healthy diet low in sodium, sugars and saturated fats and high in fruits, vegetables and whole grains.  Weight loss.   Patient advised to go to the nearest ED whenever any symptoms persist and/or worsens.  Patient/Family/Caregiver verbalized complete understanding of these instructions.  I spent a total of 25 minutes with more than 50% spent on counseling and coordinating care.   RTO in 3-4 wks.

## 2024-08-01 NOTE — HISTORY OF PRESENT ILLNESS
[FreeTextEntry1] : REGINE CARLSON is a 68 year y.o. M with medical history significant for HTN (dx 2015), DM (dx 2015), s/p covid 19 (1/2023), KALLIE, ex-smoker (quit 1992), hypertensive heart disease w preserved LV function, HPpEF, LAD myocardial bridge.   He is here for follow-up. The last time I saw him was Sep 2023.   He has lost ~20lbs since starting  mounjaro He reports BARAJAS. He feels fatigue. He feels more winded and tired during the past month.  Leg edema is controlled with lasix.    Denies CP, SOB, palpitations, orthopnea, dizziness, syncope, LH He can walk 8 blocks and can climb 1-2 flights of stairs.  Sleeps with 1 pillow  He denies history of MI, CVA Denies family history of premature ASCVD and /or SCD  No hospitalizations in the past 3 years.   DATA  ECHO (12/15/22): LV endocardium is not well visualized. Grossly normal LV function. EF 64%. Concentric LVH. Grade IDD. Mild TR ECHO (3/11/21): EF 50-55%. Mild concentric LVH. Abnormal septal motion. Trace TR PASP 23 mmHg   ECG (8/1/24): NSR at 83 bpm w left axis, LAFB, IVCD (no significant change from prior) ECG (9/11/23): NSR at 77 bpm w left axis, LAFB, LVH, RBBB ECG (11/23/22): NSR at 75 bpm with left axis, LAFB, LVH, PRWP and QRS widening (133ms) - No significant change when compared with prior.   CT chest (4/28/23): The heart is not enlarged. There is mild coronary artery calcification. There is no pericardial effusion. THer aorta and pulmonary arteries are normal in size and outline. Small lung nodules. LL mild interstitial lung disease compatible with UIP.   LHC (3/2021): LM= normal; LAD= luminal, short portion of intramyocardial bridging LCx= luminal irregularities. Ramus= large vessel, luminal. RCA= luminal, dominant  LVEF:55 % LVEDP: 19 mmHg No AS/MR   LABS (2/29/24):  HDL 57, LDL 65, , , K 4.6, CRE 0.87, eGFR 95, HCT 48.9, HGB 16.1. LFTs wnl; LABS (7/11/23): ;  ; HDL 51; LDL 67; NonHDL 96; Cre 0.98; K 4.8; eGFR 85; LFTs wnl; A1c 6.1 LABS (5/5/23) A1C 7.0, , , HDL 50, LDL 83, NON-, K 4.9, CRE 0.99, LFTs wnl, eGFR 84. LABS (11/23/22): ; ; LDL 43; HDL 43; NonHDL 115; Cre 0.95; K 4.7; eGFR 88; LFTs wnl; Hgb 15.2; Hct 47.9; A1c 6.8; D-dimer <150

## 2024-08-13 ENCOUNTER — APPOINTMENT (OUTPATIENT)
Age: 69
End: 2024-08-13
Payer: COMMERCIAL

## 2024-08-13 VITALS
OXYGEN SATURATION: 96 % | DIASTOLIC BLOOD PRESSURE: 78 MMHG | TEMPERATURE: 97.6 F | SYSTOLIC BLOOD PRESSURE: 119 MMHG | RESPIRATION RATE: 16 BRPM | WEIGHT: 214 LBS | HEIGHT: 69 IN | HEART RATE: 80 BPM | BODY MASS INDEX: 31.7 KG/M2

## 2024-08-13 DIAGNOSIS — N40.0 BENIGN PROSTATIC HYPERPLASIA WITHOUT LOWER URINARY TRACT SYMPMS: ICD-10-CM

## 2024-08-13 DIAGNOSIS — C61 MALIGNANT NEOPLASM OF PROSTATE: ICD-10-CM

## 2024-08-13 PROCEDURE — 99205 OFFICE O/P NEW HI 60 MIN: CPT

## 2024-08-13 PROCEDURE — G2211 COMPLEX E/M VISIT ADD ON: CPT

## 2024-08-13 RX ORDER — MIRABEGRON 50 MG/1
50 TABLET, EXTENDED RELEASE ORAL
Qty: 90 | Refills: 3 | Status: ACTIVE | COMMUNITY
Start: 2024-08-13 | End: 1900-01-01

## 2024-08-13 RX ORDER — TAMSULOSIN HYDROCHLORIDE 0.4 MG/1
0.4 CAPSULE ORAL
Qty: 90 | Refills: 3 | Status: ACTIVE | COMMUNITY
Start: 2024-08-13 | End: 1900-01-01

## 2024-08-16 LAB
APPEARANCE: CLEAR
BACTERIA UR CULT: NORMAL
BACTERIA: NEGATIVE /HPF
BILIRUBIN URINE: NEGATIVE
BLOOD URINE: NEGATIVE
CAST: 0 /LPF
COLOR: YELLOW
EPITHELIAL CELLS: 2 /HPF
GLUCOSE QUALITATIVE U: >=1000 MG/DL
KETONES URINE: NEGATIVE MG/DL
LEUKOCYTE ESTERASE URINE: NEGATIVE
MICROSCOPIC-UA: NORMAL
NITRITE URINE: NEGATIVE
PH URINE: 5.5
PROTEIN URINE: NEGATIVE MG/DL
PSA, POST - PROSTATECTOMY: 0.92 NG/ML
RED BLOOD CELLS URINE: 0 /HPF
SPECIFIC GRAVITY URINE: >1.03
URINE CYTOLOGY: NORMAL
UROBILINOGEN URINE: 0.2 MG/DL
WHITE BLOOD CELLS URINE: 1 /HPF

## 2024-08-16 NOTE — HISTORY OF PRESENT ILLNESS
[FreeTextEntry1] :   REGINE CARLSON Jul 20 1955   Language: English Date of First visit: 08/13/2024 Accompanied by: self Contact info: Referring Provider/PCP: Dr. Miller  Fax: tw   CC/ Problem List: prostate cancer  =============================================================================== FIRST VISIT / Summary: Very pleasant 69 year old M here for establishing care. Has some LUTs. Frequency, urgency, nocturia 2-3x and double void. However, content with voiding pattern and with current medications tamsulosin 0.4mg and Myrbetriq. Drinks 5-6 glasses water daily. Drinks water up until bedtime Denies hematuria, dysuria, strain to void, weak stream or intermittency IPSS 19 QOL 2  Reports was following with previous urologist Dr. Fernández. Diagnosed with prostate cancer 5 years ago only had radiation no hormone injections. Had some hematuria before prostate cancer diagnosis. Had workup done of imaging and cystoscopy. Verbalizes had a procedure done and hematuria resolved. Also had stricture which required dilation 2-3x. Since last dilation no difficulty with urination. ------------------------------------------------------------------------------------------- INTERVAL VISITS:   ===============================================================================   PMH: prostate cancer  Meds: tamsulosin 0.4mg, atorvastatin, Entresto, Invokana, losartan, metformin, Mounjaro, Myrbetriq 50mg All: denies  FHx: no  malignancies  SocHx: former smoker quit 1992 smoked 1-2 cigarettes week x 20 years, social Etoh, , retired    PSH: gallbladder 2002, left inguinal hernia x2 1996 [id number], right knee replacement 2017   ROS: Review of Systems is as per HPI unless otherwise denoted below   =============================================================================== DATA: LABS (SELECTED):---------------------------------------------------------------------------------------------------  08/13/2024 PSA 0.92, UA and cx negative   RADS:-------------------------------------------------------------------------------------------------------------------     PATHOLOGY/CYTOLOGY:-------------------------------------------------------------------------------------------     VOIDING STUDIES: ---------------------------------------------------------------------------------------------------- 08/13/2024  PVR 0   STONE STUDIES: (Analysis/LLSA)----------------------------------------------------------------------------------     PROCEDURES: -----------------------------------------------------------------------------------------------       =============================================================================== PHYSICAL EXAM:    FOCUSED: ----------------------------------------------------------------------------------------------------------------     ======================================================================================= DISCUSSION: ======================================================================================= ASSESSMENT and PLAN   1. LUTs -UA/UCx/cytology -advised fluid restriction 2-3 hours before bed -continue tamsulosin and Myrbetriq-refill sent   2. stricture  -advised for cystoscopy in 6 months when he returns from his country   -can consider Optilume if stricture recurs  -currently feels urination is acceptable  3. hx of prostate cancer -PSA  post today -advised to retrieve previous records from Dr. Fernández -traveling to his country to care for his mother possibly returning in January 2025 - [facility] at that time  ======================================================================================= The total time personally spent preparing for this visit (reviewing test results, obtaining external history) and during the visit (ordering tests/medications, spent face to face with the patient / family and counseling them on the above), as well as after the visit (on clinical documentation and coordination with other care providers) was approximately 65 minutes.   Thank you for allowing me to assist in the care of your patient. Should you have any questions please do not hesitate to reach out to me.     Rubin Stephens MD                                                     [facility] Physician [facility]   [place] Office: [address], [address] [place] [address] T: [phone] F: [phone]   [place] Office: [address] [place] [address], 1st floor [place] [address] T: [phone] F: [phone]

## 2024-08-16 NOTE — HISTORY OF PRESENT ILLNESS
[FreeTextEntry1] :   REGINE CARLSON Jul 20 1955   Language: English Date of First visit: 08/13/2024 Accompanied by: self Contact info: Referring Provider/PCP: Dr. Miller  Fax: tw   CC/ Problem List: prostate cancer  =============================================================================== FIRST VISIT / Summary: Very pleasant 69 year old M here for establishing care. Has some LUTs. Frequency, urgency, nocturia 2-3x and double void. However, content with voiding pattern and with current medications tamsulosin 0.4mg and Myrbetriq. Drinks 5-6 glasses water daily. Drinks water up until bedtime Denies hematuria, dysuria, strain to void, weak stream or intermittency IPSS 19 QOL 2  Reports was following with previous urologist Dr. Fernández. Diagnosed with prostate cancer 5 years ago only had radiation no hormone injections. Had some hematuria before prostate cancer diagnosis. Had workup done of imaging and cystoscopy. Verbalizes had a procedure done and hematuria resolved. Also had stricture which required dilation 2-3x. Since last dilation no difficulty with urination. ------------------------------------------------------------------------------------------- INTERVAL VISITS:   ===============================================================================   PMH: prostate cancer  Meds: tamsulosin 0.4mg, atorvastatin, Entresto, Invokana, losartan, metformin, Mounjaro, Myrbetriq 50mg All: denies  FHx: no  malignancies  SocHx: former smoker quit 1992 smoked 1-2 cigarettes week x 20 years, social Etoh, , retired    PSH: gallbladder 2002, left inguinal hernia x2 1996 [id number], right knee replacement 2017   ROS: Review of Systems is as per HPI unless otherwise denoted below   =============================================================================== DATA: LABS (SELECTED):---------------------------------------------------------------------------------------------------  08/13/2024 PSA 0.92, UA and cx negative   RADS:-------------------------------------------------------------------------------------------------------------------     PATHOLOGY/CYTOLOGY:-------------------------------------------------------------------------------------------     VOIDING STUDIES: ---------------------------------------------------------------------------------------------------- 08/13/2024  PVR 0   STONE STUDIES: (Analysis/LLSA)----------------------------------------------------------------------------------     PROCEDURES: -----------------------------------------------------------------------------------------------       =============================================================================== PHYSICAL EXAM:    FOCUSED: ----------------------------------------------------------------------------------------------------------------     ======================================================================================= DISCUSSION: ======================================================================================= ASSESSMENT and PLAN   1. LUTs -UA/UCx/cytology -advised fluid restriction 2-3 hours before bed -continue tamsulosin and Myrbetriq-refill sent   2. stricture  -advised for cystoscopy in 6 months when he returns from his country   -can consider Optilume if stricture recurs  -currently feels urination is acceptable  3. hx of prostate cancer -PSA  post today -advised to retrieve previous records from Dr. Fernández -traveling to his country to care for his mother possibly returning in January 2025 - [facility] at that time  ======================================================================================= The total time personally spent preparing for this visit (reviewing test results, obtaining external history) and during the visit (ordering tests/medications, spent face to face with the patient / family and counseling them on the above), as well as after the visit (on clinical documentation and coordination with other care providers) was approximately 65 minutes.   Thank you for allowing me to assist in the care of your patient. Should you have any questions please do not hesitate to reach out to me.     Rubin Stephens MD                                                     [facility] Physician [facility]   [place] Office: [address], [address] [place] [address] T: [phone] F: [phone]   [place] Office: [address] [place] [address], [address]st floor [place] [address] T: [phone] F: [phone]

## 2024-08-16 NOTE — HISTORY OF PRESENT ILLNESS
[FreeTextEntry1] :   REGINE CARLSON Jul 20 1955   Language: English Date of First visit: 08/13/2024 Accompanied by: self Contact info: Referring Provider/PCP: Dr. Miller  Fax: tw   CC/ Problem List: prostate cancer  =============================================================================== FIRST VISIT / Summary: Very pleasant 69 year old M here for establishing care. Has some LUTs. Frequency, urgency, nocturia 2-3x and double void. However, content with voiding pattern and with current medications tamsulosin 0.4mg and Myrbetriq. Drinks 5-6 glasses water daily. Drinks water up until bedtime Denies hematuria, dysuria, strain to void, weak stream or intermittency IPSS 19 QOL 2  Reports was following with previous urologist Dr. Fernández. Diagnosed with prostate cancer 5 years ago only had radiation no hormone injections. Had some hematuria before prostate cancer diagnosis. Had workup done of imaging and cystoscopy. Verbalizes had a procedure done and hematuria resolved. Also had stricture which required dilation 2-3x. Since last dilation no difficulty with urination. ------------------------------------------------------------------------------------------- INTERVAL VISITS:   ===============================================================================   PMH: prostate cancer  Meds: tamsulosin 0.4mg, atorvastatin, Entresto, Invokana, losartan, metformin, Mounjaro, Myrbetriq 50mg All: denies  FHx: no  malignancies  SocHx: former smoker quit 1992 smoked 1-2 cigarettes week x 20 years, social Etoh, , retired    PSH: gallbladder 2002, left inguinal hernia x2 1996 [id number], right knee replacement 2017   ROS: Review of Systems is as per HPI unless otherwise denoted below   =============================================================================== DATA: LABS (SELECTED):---------------------------------------------------------------------------------------------------  08/13/2024 PSA 0.92, UA and cx negative   RADS:-------------------------------------------------------------------------------------------------------------------     PATHOLOGY/CYTOLOGY:-------------------------------------------------------------------------------------------     VOIDING STUDIES: ---------------------------------------------------------------------------------------------------- 08/13/2024  PVR 0   STONE STUDIES: (Analysis/LLSA)----------------------------------------------------------------------------------     PROCEDURES: -----------------------------------------------------------------------------------------------       =============================================================================== PHYSICAL EXAM:    FOCUSED: ----------------------------------------------------------------------------------------------------------------     ======================================================================================= DISCUSSION: ======================================================================================= ASSESSMENT and PLAN   1. LUTs -UA/UCx/cytology -advised fluid restriction 2-3 hours before bed -continue tamsulosin and Myrbetriq-refill sent   2. stricture  -advised for cystoscopy in 6 months when he returns from his country   -can consider Optilume if stricture recurs  -currently feels urination is acceptable  3. hx of prostate cancer -PSA  post today -advised to retrieve previous records from Dr. Fernández -traveling to his country to care for his mother possibly returning in January 2025 - [facility] time  ======================================================================================= The total time personally spent preparing for this visit (reviewing test results, obtaining external history) and during the visit (ordering tests/medications, spent face to face with the patient / family and counseling them on the above), as well as after the visit (on clinical documentation and coordination with other care providers) was approximately 65 minutes.   Thank you for allowing me to assist in the care of your patient. Should you have any questions please do not hesitate to reach out to me.     Rubin Stephens MD                                                     [facility] Physician [facility]   [place] Office: [address], [address] [place] [address] T: [phone] F: [phone]   [place] Office: [address] [place] [address], 1st floor [place] [address] T: [phone] F: [phone]

## 2024-08-26 ENCOUNTER — APPOINTMENT (OUTPATIENT)
Age: 69
End: 2024-08-26

## 2024-08-29 ENCOUNTER — APPOINTMENT (OUTPATIENT)
Age: 69
End: 2024-08-29

## 2024-08-29 VITALS
DIASTOLIC BLOOD PRESSURE: 78 MMHG | HEART RATE: 92 BPM | OXYGEN SATURATION: 97 % | TEMPERATURE: 98 F | HEIGHT: 69 IN | WEIGHT: 214 LBS | SYSTOLIC BLOOD PRESSURE: 138 MMHG | RESPIRATION RATE: 14 BRPM | BODY MASS INDEX: 31.7 KG/M2

## 2024-08-29 DIAGNOSIS — M67.919 UNSPECIFIED DISORDER OF SYNOVIUM AND TENDON, UNSPECIFIED SHOULDER: ICD-10-CM

## 2024-08-29 PROCEDURE — 99214 OFFICE O/P EST MOD 30 MIN: CPT

## 2024-08-29 RX ORDER — IBUPROFEN 800 MG/1
800 TABLET, FILM COATED ORAL
Qty: 30 | Refills: 0 | Status: ACTIVE | COMMUNITY
Start: 2024-08-29 | End: 1900-01-01

## 2024-09-04 ENCOUNTER — APPOINTMENT (OUTPATIENT)
Age: 69
End: 2024-09-04
Payer: COMMERCIAL

## 2024-09-04 VITALS
DIASTOLIC BLOOD PRESSURE: 78 MMHG | HEART RATE: 75 BPM | BODY MASS INDEX: 32.58 KG/M2 | SYSTOLIC BLOOD PRESSURE: 145 MMHG | RESPIRATION RATE: 14 BRPM | TEMPERATURE: 98.6 F | OXYGEN SATURATION: 75 % | WEIGHT: 220 LBS | HEIGHT: 69 IN

## 2024-09-04 DIAGNOSIS — E78.5 HYPERLIPIDEMIA, UNSPECIFIED: ICD-10-CM

## 2024-09-04 DIAGNOSIS — I50.30 UNSPECIFIED DIASTOLIC (CONGESTIVE) HEART FAILURE: ICD-10-CM

## 2024-09-04 DIAGNOSIS — I10 ESSENTIAL (PRIMARY) HYPERTENSION: ICD-10-CM

## 2024-09-04 DIAGNOSIS — E66.9 OBESITY, UNSPECIFIED: ICD-10-CM

## 2024-09-04 DIAGNOSIS — Z01.818 ENCOUNTER FOR OTHER PREPROCEDURAL EXAMINATION: ICD-10-CM

## 2024-09-04 DIAGNOSIS — G47.33 OBSTRUCTIVE SLEEP APNEA (ADULT) (PEDIATRIC): ICD-10-CM

## 2024-09-04 DIAGNOSIS — J45.909 UNSPECIFIED ASTHMA, UNCOMPLICATED: ICD-10-CM

## 2024-09-04 DIAGNOSIS — Z01.812 ENCOUNTER FOR PREPROCEDURAL LABORATORY EXAMINATION: ICD-10-CM

## 2024-09-04 LAB
ALBUMIN SERPL ELPH-MCNC: 4.7 G/DL
ALP BLD-CCNC: 69 U/L
ALT SERPL-CCNC: 26 U/L
ANION GAP SERPL CALC-SCNC: 11 MMOL/L
AST SERPL-CCNC: 18 U/L
BILIRUB SERPL-MCNC: 0.4 MG/DL
BUN SERPL-MCNC: 16 MG/DL
CALCIUM SERPL-MCNC: 9.4 MG/DL
CHLORIDE SERPL-SCNC: 102 MMOL/L
CHOLEST SERPL-MCNC: 154 MG/DL
CO2 SERPL-SCNC: 24 MMOL/L
CREAT SERPL-MCNC: 0.86 MG/DL
EGFR: 94 ML/MIN/1.73M2
GLUCOSE SERPL-MCNC: 194 MG/DL
HCT VFR BLD CALC: 44.2 %
HDLC SERPL-MCNC: 53 MG/DL
HGB BLD-MCNC: 15.1 G/DL
LDLC SERPL CALC-MCNC: 68 MG/DL
MCHC RBC-ENTMCNC: 31.7 PG
MCHC RBC-ENTMCNC: 34.2 GM/DL
MCV RBC AUTO: 92.7 FL
NONHDLC SERPL-MCNC: 101 MG/DL
PLATELET # BLD AUTO: 202 K/UL
POTASSIUM SERPL-SCNC: 4.5 MMOL/L
PROT SERPL-MCNC: 7.5 G/DL
RBC # BLD: 4.77 M/UL
RBC # FLD: 13.5 %
SODIUM SERPL-SCNC: 137 MMOL/L
TRIGL SERPL-MCNC: 198 MG/DL
WBC # FLD AUTO: 6.38 K/UL

## 2024-09-04 PROCEDURE — 99215 OFFICE O/P EST HI 40 MIN: CPT

## 2024-09-04 NOTE — HISTORY OF PRESENT ILLNESS
[Coronary Artery Disease] : coronary artery disease [COPD] : COPD [Diabetes] : diabetes [FreeTextEntry1] : R CATARACT SURGERY  [FreeTextEntry2] : 9/11/24 [FreeTextEntry3] : DR CHAKRABORTY [FreeTextEntry4] : PT WILL HAVE R CATARACT SURGERY IN 1 WEEK  SEEN AT [facility] AND HAD CARDIAC CATH WITH ANGIOPLASTY 3 WEEKS AGO AS PER PT

## 2024-09-04 NOTE — ASSESSMENT
[Patient Optimized for Surgery] : Patient optimized for surgery [Cardiology consultation] : Cardiology consultation [Modify anti-platelet treatment prior to procedure] : Modify anti-platelet treatment prior to procedure [Continue medications as is] : Continue current medications [As per surgery] : as per surgery [FreeTextEntry4] : 69 Y OLD MALE WITH PMX OF DM ,HTN ,DYSLIPIDEMIA ,OBESITY ,ASHD S/P CARDIAC CATH WEEKS AGO AT ACCEPTABLE RISK FOR SURGERY BUT SHOULD ALSO OBTAIN PREOP EVAL BY CARDIOLOGY  [FreeTextEntry6] : AS PER CARDIO

## 2024-09-05 LAB
ESTIMATED AVERAGE GLUCOSE: 128 MG/DL
HBA1C MFR BLD HPLC: 6.1 %

## 2024-09-09 ENCOUNTER — NON-APPOINTMENT (OUTPATIENT)
Age: 69
End: 2024-09-09

## 2024-09-11 ENCOUNTER — RX RENEWAL (OUTPATIENT)
Age: 69
End: 2024-09-11

## 2024-09-18 ENCOUNTER — APPOINTMENT (OUTPATIENT)
Age: 69
End: 2024-09-18
Payer: COMMERCIAL

## 2024-09-18 VITALS
DIASTOLIC BLOOD PRESSURE: 81 MMHG | TEMPERATURE: 98.1 F | RESPIRATION RATE: 14 BRPM | WEIGHT: 220 LBS | BODY MASS INDEX: 32.58 KG/M2 | OXYGEN SATURATION: 97 % | HEART RATE: 77 BPM | HEIGHT: 69 IN | SYSTOLIC BLOOD PRESSURE: 122 MMHG

## 2024-09-18 DIAGNOSIS — N40.0 BENIGN PROSTATIC HYPERPLASIA WITHOUT LOWER URINARY TRACT SYMPMS: ICD-10-CM

## 2024-09-18 DIAGNOSIS — I10 ESSENTIAL (PRIMARY) HYPERTENSION: ICD-10-CM

## 2024-09-18 DIAGNOSIS — E78.5 HYPERLIPIDEMIA, UNSPECIFIED: ICD-10-CM

## 2024-09-18 DIAGNOSIS — Z00.00 ENCOUNTER FOR GENERAL ADULT MEDICAL EXAMINATION W/OUT ABNORMAL FINDINGS: ICD-10-CM

## 2024-09-18 DIAGNOSIS — C61 MALIGNANT NEOPLASM OF PROSTATE: ICD-10-CM

## 2024-09-18 DIAGNOSIS — G47.33 OBSTRUCTIVE SLEEP APNEA (ADULT) (PEDIATRIC): ICD-10-CM

## 2024-09-18 DIAGNOSIS — E66.9 OBESITY, UNSPECIFIED: ICD-10-CM

## 2024-09-18 PROCEDURE — 99397 PER PM REEVAL EST PAT 65+ YR: CPT

## 2024-09-18 NOTE — ASSESSMENT
[FreeTextEntry1] : CPE OF 69 Y OLD AMLE WITH PMX OF HTN ,ASHD ,DM ,HTN ,OBESITY ,BPH AND PROSTATE CA= LABS REVIEWED;MEDS ORDERED RTO WHEN BACK FROM [place] 1/25

## 2024-09-18 NOTE — PHYSICAL EXAM
[No Acute Distress] : no acute distress [Normal Sclera/Conjunctiva] : normal sclera/conjunctiva [Normal Outer Ear/Nose] : the outer ears and nose were normal in appearance [Normal] : normal rate, regular rhythm, normal S1 and S2 and no murmur heard [No Edema] : there was no peripheral edema [Soft] : abdomen soft [Non Tender] : non-tender [Normal Bowel Sounds] : normal bowel sounds [Obese] : obese [Normal Anterior Cervical Nodes] : no anterior cervical lymphadenopathy [No Rash] : no rash [Coordination Grossly Intact] : coordination grossly intact [No Focal Deficits] : no focal deficits [Alert and Oriented x3] : oriented to person, place, and time

## 2024-09-18 NOTE — HISTORY OF PRESENT ILLNESS
[de-identified] : COMES FOR CPE  GOING TO [place] IN 2 DAYS FOR 4 M ;HAD CANCELLED CATARACT SURGERY

## 2025-02-11 ENCOUNTER — APPOINTMENT (OUTPATIENT)
Age: 70
End: 2025-02-11

## 2025-02-24 ENCOUNTER — APPOINTMENT (OUTPATIENT)
Age: 70
End: 2025-02-24
Payer: COMMERCIAL

## 2025-02-24 VITALS
HEIGHT: 69 IN | RESPIRATION RATE: 14 BRPM | BODY MASS INDEX: 31.4 KG/M2 | WEIGHT: 212 LBS | HEART RATE: 69 BPM | OXYGEN SATURATION: 96 % | TEMPERATURE: 98.3 F | DIASTOLIC BLOOD PRESSURE: 83 MMHG | SYSTOLIC BLOOD PRESSURE: 131 MMHG

## 2025-02-24 DIAGNOSIS — E66.9 OBESITY, UNSPECIFIED: ICD-10-CM

## 2025-02-24 DIAGNOSIS — G47.33 OBSTRUCTIVE SLEEP APNEA (ADULT) (PEDIATRIC): ICD-10-CM

## 2025-02-24 DIAGNOSIS — E11.69 TYPE 2 DIABETES MELLITUS WITH OTHER SPECIFIED COMPLICATION: ICD-10-CM

## 2025-02-24 DIAGNOSIS — E79.0 HYPERURICEMIA W/OUT SIGNS OF INFLAMMATORY ARTHRITIS AND TOPHACEOUS DISEASE: ICD-10-CM

## 2025-02-24 DIAGNOSIS — E78.5 HYPERLIPIDEMIA, UNSPECIFIED: ICD-10-CM

## 2025-02-24 DIAGNOSIS — N40.0 BENIGN PROSTATIC HYPERPLASIA WITHOUT LOWER URINARY TRACT SYMPMS: ICD-10-CM

## 2025-02-24 PROCEDURE — 99214 OFFICE O/P EST MOD 30 MIN: CPT

## 2025-02-24 RX ORDER — TIRZEPATIDE 10 MG/.5ML
10 INJECTION, SOLUTION SUBCUTANEOUS
Qty: 3 | Refills: 2 | Status: ACTIVE | COMMUNITY
Start: 2025-02-24 | End: 1900-01-01

## 2025-02-25 LAB
ALBUMIN SERPL ELPH-MCNC: 4.6 G/DL
ALP BLD-CCNC: 88 U/L
ALT SERPL-CCNC: 32 U/L
ANION GAP SERPL CALC-SCNC: 12 MMOL/L
AST SERPL-CCNC: 19 U/L
BILIRUB SERPL-MCNC: 0.3 MG/DL
BUN SERPL-MCNC: 17 MG/DL
CALCIUM SERPL-MCNC: 9.6 MG/DL
CHLORIDE SERPL-SCNC: 104 MMOL/L
CHOLEST SERPL-MCNC: 156 MG/DL
CO2 SERPL-SCNC: 21 MMOL/L
CREAT SERPL-MCNC: 0.85 MG/DL
EGFR: 95 ML/MIN/1.73M2
GLUCOSE SERPL-MCNC: 130 MG/DL
HDLC SERPL-MCNC: 58 MG/DL
LDLC SERPL CALC-MCNC: 73 MG/DL
NONHDLC SERPL-MCNC: 98 MG/DL
POTASSIUM SERPL-SCNC: 4.2 MMOL/L
PROT SERPL-MCNC: 7.3 G/DL
SODIUM SERPL-SCNC: 137 MMOL/L
TRIGL SERPL-MCNC: 145 MG/DL

## 2025-03-04 LAB
ESTIMATED AVERAGE GLUCOSE: 134 MG/DL
HBA1C MFR BLD HPLC: 6.3 %

## 2025-05-12 ENCOUNTER — RX RENEWAL (OUTPATIENT)
Age: 70
End: 2025-05-12

## 2025-05-12 RX ORDER — SACUBITRIL AND VALSARTAN 24; 26 MG/1; MG/1
24-26 TABLET, FILM COATED ORAL
Qty: 180 | Refills: 0 | Status: ACTIVE | COMMUNITY
Start: 2025-05-12 | End: 1900-01-01

## 2025-09-17 ENCOUNTER — NON-APPOINTMENT (OUTPATIENT)
Age: 70
End: 2025-09-17

## 2025-09-18 ENCOUNTER — NON-APPOINTMENT (OUTPATIENT)
Age: 70
End: 2025-09-18

## 2025-09-19 ENCOUNTER — APPOINTMENT (OUTPATIENT)
Age: 70
End: 2025-09-19
Payer: COMMERCIAL

## 2025-09-19 VITALS
BODY MASS INDEX: 31.84 KG/M2 | SYSTOLIC BLOOD PRESSURE: 138 MMHG | TEMPERATURE: 98 F | OXYGEN SATURATION: 96 % | HEIGHT: 69 IN | RESPIRATION RATE: 15 BRPM | HEART RATE: 86 BPM | DIASTOLIC BLOOD PRESSURE: 83 MMHG | WEIGHT: 215 LBS

## 2025-09-19 DIAGNOSIS — I10 ESSENTIAL (PRIMARY) HYPERTENSION: ICD-10-CM

## 2025-09-19 DIAGNOSIS — Z00.00 ENCOUNTER FOR GENERAL ADULT MEDICAL EXAMINATION W/OUT ABNORMAL FINDINGS: ICD-10-CM

## 2025-09-19 PROCEDURE — G0439: CPT

## 2025-09-19 RX ORDER — EMPAGLIFLOZIN 25 MG/1
25 TABLET, FILM COATED ORAL
Refills: 0 | Status: ACTIVE | COMMUNITY
Start: 2025-09-19

## 2025-09-20 LAB
25(OH)D3 SERPL-MCNC: 43.7 NG/ML
ALBUMIN SERPL ELPH-MCNC: 4.8 G/DL
ALBUMIN, RANDOM URINE: 1.8 MG/DL
ALP BLD-CCNC: 78 U/L
ALT SERPL-CCNC: 29 U/L
ANION GAP SERPL CALC-SCNC: 13 MMOL/L
APPEARANCE: CLEAR
AST SERPL-CCNC: 20 U/L
BILIRUB SERPL-MCNC: 0.4 MG/DL
BILIRUBIN URINE: NEGATIVE
BLOOD URINE: NEGATIVE
BUN SERPL-MCNC: 19 MG/DL
CALCIUM SERPL-MCNC: 9.8 MG/DL
CHLORIDE SERPL-SCNC: 105 MMOL/L
CHOLEST SERPL-MCNC: 156 MG/DL
CO2 SERPL-SCNC: 21 MMOL/L
COLOR: YELLOW
CREAT SERPL-MCNC: 1.01 MG/DL
CREAT SPEC-SCNC: 84 MG/DL
EGFRCR SERPLBLD CKD-EPI 2021: 81 ML/MIN/1.73M2
ESTIMATED AVERAGE GLUCOSE: 126 MG/DL
GLUCOSE QUALITATIVE U: >=1000 MG/DL
GLUCOSE SERPL-MCNC: 138 MG/DL
HBA1C MFR BLD HPLC: 6 %
HCT VFR BLD CALC: 44.6 %
HDLC SERPL-MCNC: 55 MG/DL
HGB BLD-MCNC: 14.6 G/DL
KETONES URINE: NEGATIVE MG/DL
LDLC SERPL-MCNC: 71 MG/DL
LEUKOCYTE ESTERASE URINE: NEGATIVE
MCHC RBC-ENTMCNC: 31 PG
MCHC RBC-ENTMCNC: 32.7 G/DL
MCV RBC AUTO: 94.7 FL
MICROALBUMIN/CREAT 24H UR-RTO: 21 MG/G
NITRITE URINE: NEGATIVE
NONHDLC SERPL-MCNC: 101 MG/DL
PH URINE: 5.5
PLATELET # BLD AUTO: 220 K/UL
POTASSIUM SERPL-SCNC: 4.4 MMOL/L
PROT SERPL-MCNC: 7.5 G/DL
PROTEIN URINE: NEGATIVE MG/DL
PSA FREE FLD-MCNC: 6 %
PSA FREE SERPL-MCNC: 0.05 NG/ML
PSA SERPL-MCNC: 0.96 NG/ML
RBC # BLD: 4.71 M/UL
RBC # FLD: 14 %
SODIUM SERPL-SCNC: 139 MMOL/L
SPECIFIC GRAVITY URINE: >1.03
TRIGL SERPL-MCNC: 180 MG/DL
TSH SERPL-ACNC: 1.31 UIU/ML
URATE SERPL-MCNC: 6 MG/DL
UROBILINOGEN URINE: 0.2 MG/DL
VIT B12 SERPL-MCNC: 1146 PG/ML
WBC # FLD AUTO: 6.48 K/UL

## 2025-09-24 PROBLEM — J84.10 PULMONARY FIBROSIS: Status: ACTIVE | Noted: 2025-09-24

## 2025-09-24 PROBLEM — R05.9 COUGH: Status: ACTIVE | Noted: 2025-09-24
